# Patient Record
Sex: FEMALE | Race: WHITE
[De-identification: names, ages, dates, MRNs, and addresses within clinical notes are randomized per-mention and may not be internally consistent; named-entity substitution may affect disease eponyms.]

---

## 2022-10-13 ENCOUNTER — HOSPITAL ENCOUNTER (INPATIENT)
Dept: HOSPITAL 97 - ER | Age: 82
LOS: 1 days | Discharge: HOME | DRG: 313 | End: 2022-10-14
Attending: INTERNAL MEDICINE | Admitting: INTERNAL MEDICINE
Payer: COMMERCIAL

## 2022-10-13 VITALS — OXYGEN SATURATION: 98 %

## 2022-10-13 VITALS — BODY MASS INDEX: 31.6 KG/M2

## 2022-10-13 DIAGNOSIS — I35.1: ICD-10-CM

## 2022-10-13 DIAGNOSIS — E87.1: ICD-10-CM

## 2022-10-13 DIAGNOSIS — R07.89: Primary | ICD-10-CM

## 2022-10-13 DIAGNOSIS — Z20.822: ICD-10-CM

## 2022-10-13 DIAGNOSIS — I10: ICD-10-CM

## 2022-10-13 DIAGNOSIS — Z82.0: ICD-10-CM

## 2022-10-13 DIAGNOSIS — J30.9: ICD-10-CM

## 2022-10-13 DIAGNOSIS — Z91.14: ICD-10-CM

## 2022-10-13 DIAGNOSIS — R00.0: ICD-10-CM

## 2022-10-13 DIAGNOSIS — F32.A: ICD-10-CM

## 2022-10-13 DIAGNOSIS — Z79.899: ICD-10-CM

## 2022-10-13 DIAGNOSIS — F41.9: ICD-10-CM

## 2022-10-13 DIAGNOSIS — Z88.8: ICD-10-CM

## 2022-10-13 DIAGNOSIS — K21.9: ICD-10-CM

## 2022-10-13 DIAGNOSIS — J44.9: ICD-10-CM

## 2022-10-13 DIAGNOSIS — K57.90: ICD-10-CM

## 2022-10-13 DIAGNOSIS — Z88.3: ICD-10-CM

## 2022-10-13 DIAGNOSIS — E05.90: ICD-10-CM

## 2022-10-13 DIAGNOSIS — E78.5: ICD-10-CM

## 2022-10-13 DIAGNOSIS — M81.0: ICD-10-CM

## 2022-10-13 DIAGNOSIS — I24.8: ICD-10-CM

## 2022-10-13 LAB
ALBUMIN SERPL BCP-MCNC: 3.8 G/DL (ref 3.4–5)
ALP SERPL-CCNC: 91 U/L (ref 45–117)
ALT SERPL W P-5'-P-CCNC: 20 U/L (ref 12–78)
AST SERPL W P-5'-P-CCNC: 13 U/L (ref 15–37)
BUN BLD-MCNC: 20 MG/DL (ref 7–18)
GLUCOSE SERPLBLD-MCNC: 128 MG/DL (ref 74–106)
HCT VFR BLD CALC: 48 % (ref 36–45)
LYMPHOCYTES # SPEC AUTO: 1.4 K/UL (ref 0.7–4.9)
MAGNESIUM SERPL-MCNC: 2.3 MG/DL (ref 1.8–2.4)
MCV RBC: 92.9 FL (ref 80–100)
NT-PROBNP SERPL-MCNC: 497 PG/ML (ref ?–450)
PMV BLD: 7.7 FL (ref 7.6–11.3)
POTASSIUM SERPL-SCNC: 3.8 MMOL/L (ref 3.5–5.1)
RBC # BLD: 5.17 M/UL (ref 3.86–4.86)

## 2022-10-13 PROCEDURE — 83735 ASSAY OF MAGNESIUM: CPT

## 2022-10-13 PROCEDURE — 85025 COMPLETE CBC W/AUTO DIFF WBC: CPT

## 2022-10-13 PROCEDURE — 80053 COMPREHEN METABOLIC PANEL: CPT

## 2022-10-13 PROCEDURE — 83880 ASSAY OF NATRIURETIC PEPTIDE: CPT

## 2022-10-13 PROCEDURE — 84484 ASSAY OF TROPONIN QUANT: CPT

## 2022-10-13 PROCEDURE — 84443 ASSAY THYROID STIM HORMONE: CPT

## 2022-10-13 PROCEDURE — 36415 COLL VENOUS BLD VENIPUNCTURE: CPT

## 2022-10-13 PROCEDURE — 87811 SARS-COV-2 COVID19 W/OPTIC: CPT

## 2022-10-13 PROCEDURE — 71046 X-RAY EXAM CHEST 2 VIEWS: CPT

## 2022-10-13 PROCEDURE — 84439 ASSAY OF FREE THYROXINE: CPT

## 2022-10-13 PROCEDURE — 80061 LIPID PANEL: CPT

## 2022-10-13 PROCEDURE — 99281 EMR DPT VST MAYX REQ PHY/QHP: CPT

## 2022-10-13 PROCEDURE — 80048 BASIC METABOLIC PNL TOTAL CA: CPT

## 2022-10-13 PROCEDURE — 93005 ELECTROCARDIOGRAM TRACING: CPT

## 2022-10-13 PROCEDURE — 93306 TTE W/DOPPLER COMPLETE: CPT

## 2022-10-13 RX ADMIN — AMLODIPINE BESYLATE SCH MG: 2.5 TABLET ORAL at 21:59

## 2022-10-13 RX ADMIN — ENOXAPARIN SODIUM SCH: 80 INJECTION SUBCUTANEOUS at 20:41

## 2022-10-13 RX ADMIN — ALPRAZOLAM SCH MG: 0.25 TABLET ORAL at 21:59

## 2022-10-13 RX ADMIN — ENOXAPARIN SODIUM SCH MG: 80 INJECTION SUBCUTANEOUS at 21:00

## 2022-10-13 NOTE — HP
Date of Admission:  10/13/2022



Chief Complaint:  Chest pain.



History Of Present Illness:  This is an 81-year-old very pleasant female patient, who has significant
 anxiety and depression problem after loss of her  and her son.  Takes her medications regular
ly, came into office for her regular followup visit.  Upon evaluation, her vital signs revealed that 
her heart rate was faster than normal.  Her pulse rate was 150 per minute and it was regular except e
very now and then, some irregularities was noted on examination.  Stat EKG was done, which did not sh
ow any atrial fibrillation as I was concerned about, but it did report a sinus tachycardia and it did
 show some ST-T changes.  The patient reports having some chest pressure type of feeling and she is v
shant vague historian, says that she had this chest pressure type of feeling in the past, but she had s
ome chest pressure type of feeling today as well.  Denies any shortness of breath, nausea, vomiting. 
 Because of significant anxiety and depression, she was crying most of the time while she was at the 
office.  After I talked to her and talked to her caregiver per the patient's request, I did recommend
 her to be admitted to the hospital and she was agreeable and plan of treatment was also discussed wi
th her.  The patient requested me to call her son to discuss details with him and I did try to contac
t him this evening and he was not available, so I will try again to contact him tomorrow.



Allergies:  BUSPIRONE AND SHE DESCRIBES FEELING CRAZY AFTER SHE TOOK THAT MEDICINE AND ALLERGIC TO CI
PRO, WHICH CAUSED CHEST PAIN TYPE OF SIDE EFFECTS.



Medications:  She takes atorvastatin 20 mg daily, amlodipine 2.5 mg 2 times a day, propranolol extend
ed release 80 mg daily at bedtime, olmesartan 40 mg p.o. daily, bupropion 75 mg 2 times a day, Pristi
q 100 mg daily.



Review of Systems:

Cardiovascular:  As mentioned above. 

Psychiatry:  As mentioned above. 



All other systems reviewed and negative.



Past Medical History:  Significant for allergic rhinitis, hypertension, hyperlipidemia, diverticulosi
s, hyperthyroidism, anxiety, depression, osteoporosis, and hyponatremia.



Past Surgical History:  Significant for tonsillectomy, back surgery, knee surgery, removal of lipoma 
from back.



Family History:  Father  from aortic aneurysm.  Mother , had dementia.



Social History:  Negative for smoking and alcohol use.



Physical Examination:

Vital Signs:  At office today, pulse rate was 150, blood pressure was 121/80, respiratory rate 18, te
mperature 97.1, weight 152.6 kg, height 63 inches. 

General:  Awake, alert, oriented, not in distress. 

HEENT:  Head atraumatic, normocephalic.  Conjunctivae nonerythematous.  Sclerae white.  Mouth, no thr
ush or edema noted.  Ears/Nose, no mass, lesion, discharge noted. 

Neck:  Supple. No JVD, lymph nodes, bruit, thyromegaly noted. 

Lungs:  Bilateral good equal air entry. Clear to auscultation. No rhonchi.  No rales. 

Heart:  Normal heart sounds, no murmur or gallop. 

Abdomen:  Soft, bowel sounds normal. No guarding, rigidity, tenderness, mass, hepatosplenomegaly, dis
tention, or bruit noted. 

Extremities:  No leg edema.  No calf tenderness. 

Skin:  No rash, ulcer, cellulitis. 

Lymphatics:  No lymph node enlargement in neck, supraclavicular, infraclavicular region. 

Neuro:  No focal neurological deficit. 

Chest:  Unremarkable. 

External Genitalia:  Deferred. 

Rectal:  Deferred. 

Psychiatry:  Patient appears extremely anxious and frowned a lot due to her underlying depression.



Laboratory Data:  EKG shows nonspecific ST-T changes.  Heart rate 150 per minute and shows sinus tach
ycardia, still concerned about the underlying possibility of atrial fibrillation, but we did not see 
at present time.  Sodium 138, potassium 3.8, chloride 103, bicarb 29, BUN 20, creatinine 1.20, glucos
e 128.  Liver function tests unremarkable.  ProBNP 497.  Troponin 135.  COVID-19 test negative.  Whit
e count 8.3, hemoglobin 16.2, platelets 232.  Chest x-ray, no acute cardiopulmonary changes.



Impression:  

1.Non-ST elevation myocardial infarction.

2.Hypertension.

3.Hyperlipidemia.

4.Anxiety.

5.Depression.

6.Osteoporosis.

7.Hyperthyroidism.

8.Allergic rhinitis.

9.Diverticulosis.



Plan:  We will go ahead and admit the patient to hospital for further evaluation and management of th
is problem.  The patient is appropriate for inpatient and is expected to spend 2 midnights in [facility].  We will admit her to telemetry.  Home medications will be continued, which will be amlodipine for
 hypertension.  Instead of propranolol, I will start her on metoprolol 25 mg 2 times a day.  First do
se was given earlier today and we will give second dose tonight.  We will start her on aspirin therap
y, increase dose of cholesterol medication, which is atorvastatin to 40 mg daily at bedtime.  We will
 continue bupropion per order for her anxiety and depression problem.  The patient is having signific
ant anxiety problem and we will go ahead and start her on alprazolam 0.25 mg 2 times a day starting t
onight.  Her anxiety is unfortunately going to be lot worse while she is in the hospital because of t
he stressful situation.  Initially, she was not even willing to go to the hospital, but after I talke
d to her, she was agreeable to do so.  We will consult Cardiology Service.  I will go ahead and get a
n echo with Doppler tomorrow.  I did contact Dr. Fischer and discussed all the details with him and h
e will evaluate her tomorrow morning.  We will monitor on telemetry for any cardiac arrhythmia.  Love
nox 1 mg/kg subcutaneous injection every 12 hours will be started with the first dose tonight.  I did
 try to contact the patient's son and he was not available, we will try to contact him again tomorrow
.





GABY/OSKAR

DD:  10/13/2022 20:15:41Voice ID:  [id number]

## 2022-10-13 NOTE — ER
Nurse's Notes                                                                                     

 [facility]                                                                 

Name: Nellie Malcolm                                                                                

Age: 81 yrs                                                                                       

Sex: Female                                                                                       

: 1940                                                                                   

MRN: [id number]                                                                                   

Arrival Date: 10/13/2022                                                                          

Time: 14:42                                                                                       

Account#: [id number]                                                                            

Bed Direct Admit                                                                                  

Private MD: Rubén Archuleta                                                                          

Diagnosis: Chest pain, unspecified                                                                

                                                                                                  

Presentation:                                                                                     

10/13                                                                                             

14:45 Chief complaint: Patient states: direct admit by . Coronavirus screen: Vaccine   [facility] 

      status: Patient reports receiving the 2nd dose of the covid vaccine. Client denies          

      travel out of the [place] in the last 14 days. Ebola Screen: Patient negative for fever        

      greater than or equal to 101.5 degrees Fahrenheit, and additional compatible Ebola          

      Virus Disease symptoms Patient denies exposure to infectious person. Patient denies         

      travel to an Ebola-affected area in the 21 days before illness onset. Initial Sepsis        

      Screen: Does the patient meet any 2 criteria? No. Patient's initial sepsis screen is        

      negative. Does the patient have a suspected source of infection? No. Patient's initial      

      sepsis screen is negative. Risk Assessment: Do you want to hurt yourself or someone         

      else? Patient reports no desire to harm self or others.                                     

14:45 Method Of Arrival: Ambulatory                                                           [facility] 

15:00 Acuity: STEVIE 3                                                                           em6 

15:00 Onset of symptoms was 2022.                                                 6 

                                                                                                  

Triage Assessment:                                                                                

14:50 General: Appears in no apparent distress. slender, well groomed, well developed,        [facility] 

      Behavior is calm, cooperative, appropriate for age. Pain: Denies pain. Cardiovascular:      

      No deficits noted.                                                                          

                                                                                                  

Historical:                                                                                       

- Allergies:                                                                                      

14:54 No Known Allergies;                                                                     [facility] 

- Home Meds:                                                                                      

18:17 sodium chloride 1 gram oral tab daily [Active]; bupropion HCl 75 mg Oral tab twice a    iw  

      day [Active]; desvenlafaxine 50 mg oral tr24 1 tab once daily [Active];                     

- PMHx:                                                                                           

14:54 Dementia;                                                                               [facility] 

                                                                                                  

- Immunization history:: Adult Immunizations up to date.                                          

- Social history:: Smoking status: Patient denies any tobacco usage or history of.                

                                                                                                  

                                                                                                  

Screening:                                                                                        

15:00 Abuse screen: Denies threats or abuse. Nutritional screening: No deficits noted.        em6 

      Tuberculosis screening: No symptoms or risk factors identified. Fall Risk Total Phillips       

      Fall Scale indicates No Risk (0-24 pts).                                                    

                                                                                                  

Assessment:                                                                                       

15:00 General: Appears uncomfortable, Behavior is anxious. Pain: Denies pain. Neuro: Level of em6 

      Consciousness is awake, alert, obeys commands, Oriented to person, place, time,             

      situation. Cardiovascular: Heart tones present Patient's skin is warm and dry.              

      Respiratory: Airway is patent Respiratory effort is even, unlabored, Respiratory            

      pattern is regular, symmetrical. GI: No signs and/or symptoms were reported involving       

      the gastrointestinal system. : No signs and/or symptoms were reported regarding the       

      genitourinary system. EENT: No signs and/or symptoms were reported regarding the EENT       

      system. Derm: No signs and/or symptoms reported regarding the dermatologic system.          

      Musculoskeletal: Circulation, motion, and sensation intact. Range of motion: intact in      

      all extremities.                                                                            

16:00 Reassessment: No changes from previously documented assessment. Patient and/or family   em6 

      updated on plan of care and expected duration. Pain level reassessed. Patient is alert,     

      oriented x 3, equal unlabored respirations, skin warm/dry/pink.                             

17:00 Reassessment: No changes from previously documented assessment. Patient and/or family   em6 

      updated on plan of care and expected duration. Pain level reassessed. Patient is alert,     

      oriented x 3, equal unlabored respirations, skin warm/dry/pink.                             

18:00 Reassessment: No changes from previously documented assessment. Patient and/or family   em6 

      updated on plan of care and expected duration. Pain level reassessed. Patient is alert,     

      oriented x 3, equal unlabored respirations, skin warm/dry/pink.                             

19:00 Reassessment: No changes from previously documented assessment. Patient and/or family   em6 

      updated on plan of care and expected duration. Pain level reassessed. Patient is alert,     

      oriented x 3, equal unlabored respirations, skin warm/dry/pink.                             

                                                                                                  

Vital Signs:                                                                                      

14:45  / 74; Pulse 73; Resp 16; Temp 98.8; Pulse Ox 98% ; Weight 70.31 kg; Height 5 ft. [facility] 

      4 in. (162.56 cm);                                                                          

14:45 Body Mass Index 26.61 (70.31 kg, 162.56 cm)                                             [facility] 

                                                                                                  

ED Course:                                                                                        

14:42 Patient arrived in ED.                                                                  mr  

14:43 Rubén Archuleta MD is Private Physician.                                                  mr  

14:50 Arm band placed on right wrist.                                                         jh5 

15:00 Placed in gown. Bed in low position. Call light in reach. Side rails up X2. Cardiac     em6 

      monitor on. Pulse ox on. NIBP on. Warm blanket given.                                       

15:00 Patient maintains SpO2 saturation greater than 95% on room air.                         em6 

16:07 Sarah Joseph, RN is Primary Nurse.                                                   em6 

20:00 No provider procedures requiring assistance completed. Patient admitted, IV remains in  em6 

      place.                                                                                      

20:01 Rubén Archuleta MD is Hospitalizing Provider.                                             iw  

23:16 Triage completed.                                                                       em6 

                                                                                                  

Administered Medications:                                                                         

No medications were administered                                                                  

                                                                                                  

                                                                                                  

Medication:                                                                                       

20:00 VIS not applicable for this client.                                                     em6 

                                                                                                  

Outcome:                                                                                          

20:00 Admitted to Med/surg accompanied by tech, via stretcher.                                em6 

20:00 Condition: stable                                                                           

20:00 Instructed on the need for admit, Demonstrated understanding of instructions.               

20:02 Decision to Hospitalize by Provider.                                                    iw  

20:02 Patient left the ED.                                                                    iw  

                                                                                                  

Signatures:                                                                                       

Maisha Delaney Irene, RN                     JUNIOR                                                      

Madeleine Malave RN                       RN   [facility]                                                  

Sarah Joseph, RN                     RN   em6                                                  

                                                                                                  

Corrections: (The following items were deleted from the chart)                                    

23:15 23:15 Patient maintains SpO2 saturation greater than 95% on room air. em6               em6 

23:15 23:15 No provider procedures requiring assistance completed. em6                        em6 

23:15 23:15 Patient admitted, IV remains in place. em6                                        em6 

                                                                                                  

**************************************************************************************************

## 2022-10-13 NOTE — RAD REPORT
EXAM DESCRIPTION:  RAD - Chest Pa And Lat (2 Views) - 10/13/2022 6:41 pm

 

CLINICAL HISTORY:  chest pain

Chest pain.

 

COMPARISON:  CHEST PA AND LAT 2 VIEW dated 12/21/2014

 

TECHNIQUE:  PA and lateral views of the chest were obtained.

 

FINDINGS:  The lungs are hyperexpanded compatible with COPD. The heart is upper limit of normal in si
ze. No fracture or aggressive bony process.

 

IMPRESSION:  COPD without acute process identified.

 

The USPSTF recommends annual screening for lung cancer with low-dose CT (LDCT) in adults aged 50 to 8
5 years who have a 20 pack-year smoking history and currently smoke or have quit within the past 15 y
ears.

## 2022-10-14 VITALS — SYSTOLIC BLOOD PRESSURE: 147 MMHG | DIASTOLIC BLOOD PRESSURE: 94 MMHG

## 2022-10-14 VITALS — TEMPERATURE: 97.7 F

## 2022-10-14 LAB
BUN BLD-MCNC: 22 MG/DL (ref 7–18)
GLUCOSE SERPLBLD-MCNC: 113 MG/DL (ref 74–106)
HDLC SERPL-MCNC: 48 MG/DL (ref 40–60)
LDLC SERPL CALC-MCNC: 80 MG/DL (ref ?–130)
MAGNESIUM SERPL-MCNC: 2.3 MG/DL (ref 1.8–2.4)
POTASSIUM SERPL-SCNC: 4 MMOL/L (ref 3.5–5.1)
TROPONIN I SERPL HS-MCNC: 99.3 PG/ML (ref ?–58.9)
TSH SERPL DL<=0.05 MIU/L-ACNC: 0.26 UIU/ML (ref 0.36–3.74)

## 2022-10-14 RX ADMIN — ENOXAPARIN SODIUM SCH MG: 80 INJECTION SUBCUTANEOUS at 08:19

## 2022-10-14 RX ADMIN — AMLODIPINE BESYLATE SCH MG: 2.5 TABLET ORAL at 08:20

## 2022-10-14 RX ADMIN — ALPRAZOLAM SCH: 0.25 TABLET ORAL at 08:06

## 2022-10-14 RX ADMIN — ALPRAZOLAM SCH MG: 0.25 TABLET ORAL at 12:00

## 2022-10-14 NOTE — ECHO
HEIGHT: 5 ft 2 in   WEIGHT: 173 lb 3.2 oz   DATE OF STUDY: 10/14/2022   REFER DR: Rubén Archuleta MD

2-DIMENSIONAL: YES

     M.MODE: YES

 DOPPLER: YES

COLOR FLOW: YES



                    TDS:  NO

PORTABLE: YES

 DEFINITY:  NO

BUBBLE STUDY: NO





DIAGNOSIS:  CHEST PAIN



CARDIAC HISTORY:  

CATHERIZATION: 

SURGERY: 

PROSTHETIC VALVE: 

PACEMAKER: 





MEASUREMENTS (cm)

    DIASTOLIC (NORMALS)                 SYSTOLIC (NORMALS)

IVSd                 1.1 (0.6-1.2)                    LA Diam 3.8 (1.9-4.0)     LVEF       
  55%  

LVIDd               3.9 (3.5-5.7)                        LVIDs      2.8 (2.0-3.5)     %FS  
        28%

LVPWd             1.1 (0.6-1.2)

Ao Diam           3.0 (2.0-3.7)



2 DIMENSIONAL ASSESSMENT:

RIGHT ATRIUM:                   NORMAL

LEFT ATRIUM:       NORMAL



RIGHT VENTRICLE:            NORMAL

LEFT VENTRICLE: NORMAL



TRICUSPID VALVE:             NORMAL

MITRAL VALVE:  MITRAL ANNULAR CALCIFICATION



PULMONIC VALVE:             NORMAL

AORTIC VALVE:     SCLEROSIS



PERICARDIAL EFFUSION: NONE

AORTIC ROOT:      NORMAL





LEFT VENTRICULAR WALL MOTION:     NORMAL



DOPPLER/COLOR FLOW:     MILD TRICUSPID REGURGITATION.



COMMENTS:      MILD TRICUSPID REGURGITATION. NORMAL RIGHT VENTRICULAR SYSTOLIC PRESSURE. 
NORMAL LEFT VENTRICULAR SIZE AND FUNCTION. MITRAL ANNULAR CALCIFICATION. AORTIC SCLEROSIS 
WITH NO STENOSIS.



TECHNOLOGIST:   ANNA GOODE

## 2022-10-14 NOTE — DS
Date of Discharge:  10/14/2022



Disposition:  Discharged to go home.



Physical Examination:

HEENT:  Unremarkable. 

Lungs:  Clear to auscultation. 

Heart:  Sounds normal. 

Abdomen:  Soft.  Bowel sounds normal.  No guarding, rigidity, tenderness, or distention. 

Extremities:  No leg edema.



Discharge Medications/instructions:  

1.Continue all prior home medications except stop propranolol.

2.Metoprolol 50 mg 2 times a day.

3.Start aspirin 81 mg daily, take it with food. 

4.Increase dose of atorvastatin 20 mg, take 2 tablets by mouth daily.

5.Follow up at my office and Dr. Fischer next week.



Hospital Course:  This is an 81-year-old very pleasant female patient, who came into office yesterday
 and was admitted to the hospital.  Please see dictated H and P for more information.  After patient 
was evaluated at the office, she was noted to have rapid heart rate of around 150 per minute.  The ou
tpatient EKG had shown sinus tachycardia, but no evidence of atrial fibrillation.  The patient was ad
mitted to the hospital and her initial blood work was unremarkable.  Electrolytes were normal.  Her i
nitial troponin was 135 this morning.  Repeat troponin was 95.  After she came into the hospital, she
 was started on metoprolol, Lovenox, aspirin, and high dose statin therapy.  Dr. Fischer from Cardiol
ogy Service was consulted and he did see patient this morning.  Details were discussed with him yeste
rday and this morning.  After Dr. Fischer evaluated her, he informed me that from his point of view, 
the patient can go home, and he will arrange for outpatient stress test to be done at his office and 
at this point, he does not believe that the patient had any myocardial infarction and I agree on basi
s of her second troponin level that probably what she had was demand ischemia and not necessarily any
 evidence of myocardial infarction.  Echocardiogram was done today per order and Dr. Fischer will mihai
luate that and informed me of the results.  Extremely agitated, upset, and will not allow nursing sta
ff to do anything for her, and wanted to talk to me, so when I talked to her, she was very upset at m
e and angry and informed me that she had not seen me and I have not done anything for her.  She even 
told me that she is going to fire me and she will continue to use Dr. Fischer on an outpatient basis 
and told me that she should not have come to the hospital as she just does not feel comfortable being
 in this hospital at all and does not trust anybody.  I did call the patient's son and communicated a
ll the details with him and he informed me that the patient has had multiple hospitalizations in [place] and during some of the hospitalization, she actually was very upset, anxious, agitated, wanted to 
leave hospital, and at some point, son had to talk to even hospital psychiatrist and during one of th
e hospital admissions, her sodium level was very low.  During our hospitalization here today, her sod
ium level is normal.  The patient has significant anxiety and depression problem.  Last night, she wa
s given 1 dose of alprazolam and around lunch time today when I ordered second dose of alprazolam, sh
amber refused to take any.  I did inform the patient's son that medically she is stable for discharge and
 she then informed me that, that is good enough for him to go ahead and make arrangements for her to 
go home and he will communicate with her and take care of her needs.  If the patient decides to ivana garland to see me, which I hope so, she will come see me next week as suggested.  I will not change any o
f her medication for anxiety or depression and I have suggested patient's son that the patient should
 start seeing psychiatrist on a regular basis for management of her anxiety and depression problem.



Final Diagnoses:  

1.Chest pain.

2.Sinus tachycardia.

3.Anxiety.

4.Depression.

5.Hypertension.

6.Hyperlipidemia.

7.Aortic valve regurgitation.

8.Diverticulosis.





GABY/MODL

DD:  10/14/2022 16:02:23Voice ID:  [id number]

DT:  10/14/2022 19:48:23Report ID:  [id number]

## 2022-10-16 NOTE — CON
Date of Consultation:  10/14/2022



Reason For Consultation:  Chest pain.



History Of Present Illness:  Ms. Malcolm is 81.  Has a history of COPD, anxiety, hypertension.  Came i
n with what looks more like a COPD exacerbation, atypical chest pain, pressure-like, nonradiating, no
nexertional, some diaphoresis, but no nausea or vomiting.  Denied PND, orthopnea, pedal edema, palpit
ations, or syncope.  Troponin was 135.  Glucose was 138.  Ms. Malcolm has been under significant amoun
t of stress.  She has lost her , has lost her son recently.  Again, as stated earlier chest pr
essure was rather continuous without other cardiac symptoms.  She is feeling pretty much back to norm
al today.  Had received some Xanax overnight and that really helped her out.



Past Medical History:  Includes hypertension, dyslipidemia.



Review of Systems:

Negative.



Social History:  Negative.



Family History:  Noncontributory.



Medications:  Present medications include Xanax, metoprolol, Norvasc, aspirin, statin, and Lovenox.



Physical Examination:

Vital Signs:  Stable, afebrile. 

HEENT:  Negative. 

Neck:  Supple with no bruit. 

Chest:  Clear. 

Cardiac:  Revealed a regular rhythm and rate.  No murmurs, gallops, or rubs. 

Abdomen:  Benign. 

Extremities:  Revealed no clubbing, cyanosis, or edema.



Diagnostic Data:  As stated earlier.  EKG is unremarkable.



Impression And Plan:  Atypical chest pain, possibly anxiety related or gastroesophageal reflux diseas
e related.  She does have high blood pressure and cholesterol and is 81 and certainly at risk of havi
ng coronary artery disease.  I am comfortable with her going home whenever it is okay with Dr. Archuleta. 
 I will make an arrangement for her to have an echocardiogram and a Lexiscan in the near future and I
 will see her in the office.  Meanwhile, continue her present regimen as far as her anxiety, high blo
od pressure dyslipidemia.  Her chest x-ray showed some mild chronic obstructive pulmonary disease, wh
ich we will just observe for now.  Case was discussed with Dr. Archuleta.





NB/OSKAR

DD:  10/16/2022 11:53:38Voice ID:  [id number]

DT:  10/16/2022 15:41:40Report ID:  [id number]

## 2022-10-19 ENCOUNTER — HOSPITAL ENCOUNTER (INPATIENT)
Dept: HOSPITAL 97 - ER | Age: 82
LOS: 12 days | Discharge: HOME HEALTH | DRG: 536 | End: 2022-10-31
Attending: INTERNAL MEDICINE | Admitting: INTERNAL MEDICINE
Payer: COMMERCIAL

## 2022-10-19 VITALS — BODY MASS INDEX: 30.1 KG/M2

## 2022-10-19 DIAGNOSIS — S32.10XA: ICD-10-CM

## 2022-10-19 DIAGNOSIS — K57.90: ICD-10-CM

## 2022-10-19 DIAGNOSIS — S30.0XXA: ICD-10-CM

## 2022-10-19 DIAGNOSIS — E78.5: ICD-10-CM

## 2022-10-19 DIAGNOSIS — S32.591A: Primary | ICD-10-CM

## 2022-10-19 DIAGNOSIS — Z88.1: ICD-10-CM

## 2022-10-19 DIAGNOSIS — Z79.82: ICD-10-CM

## 2022-10-19 DIAGNOSIS — D64.9: ICD-10-CM

## 2022-10-19 DIAGNOSIS — Z88.8: ICD-10-CM

## 2022-10-19 DIAGNOSIS — F32.9: ICD-10-CM

## 2022-10-19 DIAGNOSIS — Z20.822: ICD-10-CM

## 2022-10-19 DIAGNOSIS — F03.90: ICD-10-CM

## 2022-10-19 DIAGNOSIS — I95.9: ICD-10-CM

## 2022-10-19 DIAGNOSIS — Z79.899: ICD-10-CM

## 2022-10-19 DIAGNOSIS — Z63.4: ICD-10-CM

## 2022-10-19 DIAGNOSIS — E03.9: ICD-10-CM

## 2022-10-19 DIAGNOSIS — E87.1: ICD-10-CM

## 2022-10-19 DIAGNOSIS — F41.9: ICD-10-CM
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
## 2022-10-19 DIAGNOSIS — I25.10: ICD-10-CM

## 2022-10-19 DIAGNOSIS — I35.1: ICD-10-CM
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
## 2022-10-19 DIAGNOSIS — I10: ICD-10-CM
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
## 2022-10-19 DIAGNOSIS — Z78.1: ICD-10-CM

## 2022-10-19 DIAGNOSIS — K59.00: ICD-10-CM

## 2022-10-19 DIAGNOSIS — I48.0: ICD-10-CM

## 2022-10-19 DIAGNOSIS — R77.8: ICD-10-CM

## 2022-10-19 DIAGNOSIS — R11.2: ICD-10-CM

## 2022-10-19 DIAGNOSIS — E86.9: ICD-10-CM

## 2022-10-19 LAB
BUN BLD-MCNC: 18 MG/DL (ref 7–18)
GLUCOSE SERPLBLD-MCNC: 106 MG/DL (ref 74–106)
HCT VFR BLD CALC: 46.1 % (ref 36–45)
INR BLD: 0.97
LYMPHOCYTES # SPEC AUTO: 1.5 K/UL (ref 0.7–4.9)
MCV RBC: 92.4 FL (ref 80–100)
PMV BLD: 8.1 FL (ref 7.6–11.3)
POTASSIUM SERPL-SCNC: 4.2 MMOL/L (ref 3.5–5.1)
RBC # BLD: 4.99 M/UL (ref 3.86–4.86)

## 2022-10-19 PROCEDURE — 97161 PT EVAL LOW COMPLEX 20 MIN: CPT

## 2022-10-19 PROCEDURE — 97164 PT RE-EVAL EST PLAN CARE: CPT

## 2022-10-19 PROCEDURE — 76705 ECHO EXAM OF ABDOMEN: CPT

## 2022-10-19 PROCEDURE — 97530 THERAPEUTIC ACTIVITIES: CPT

## 2022-10-19 PROCEDURE — 85014 HEMATOCRIT: CPT

## 2022-10-19 PROCEDURE — 74176 CT ABD & PELVIS W/O CONTRAST: CPT

## 2022-10-19 PROCEDURE — 80053 COMPREHEN METABOLIC PANEL: CPT

## 2022-10-19 PROCEDURE — 96374 THER/PROPH/DIAG INJ IV PUSH: CPT

## 2022-10-19 PROCEDURE — 87811 SARS-COV-2 COVID19 W/OPTIC: CPT

## 2022-10-19 PROCEDURE — 99285 EMERGENCY DEPT VISIT HI MDM: CPT

## 2022-10-19 PROCEDURE — 84484 ASSAY OF TROPONIN QUANT: CPT

## 2022-10-19 PROCEDURE — 83735 ASSAY OF MAGNESIUM: CPT

## 2022-10-19 PROCEDURE — 85018 HEMOGLOBIN: CPT

## 2022-10-19 PROCEDURE — 36415 COLL VENOUS BLD VENIPUNCTURE: CPT

## 2022-10-19 PROCEDURE — 85730 THROMBOPLASTIN TIME PARTIAL: CPT

## 2022-10-19 PROCEDURE — 85025 COMPLETE CBC W/AUTO DIFF WBC: CPT

## 2022-10-19 PROCEDURE — 93005 ELECTROCARDIOGRAM TRACING: CPT

## 2022-10-19 PROCEDURE — 96375 TX/PRO/DX INJ NEW DRUG ADDON: CPT

## 2022-10-19 PROCEDURE — 97110 THERAPEUTIC EXERCISES: CPT

## 2022-10-19 PROCEDURE — 70450 CT HEAD/BRAIN W/O DYE: CPT

## 2022-10-19 PROCEDURE — 85610 PROTHROMBIN TIME: CPT

## 2022-10-19 PROCEDURE — 97116 GAIT TRAINING THERAPY: CPT

## 2022-10-19 PROCEDURE — 80048 BASIC METABOLIC PNL TOTAL CA: CPT

## 2022-10-19 PROCEDURE — 72125 CT NECK SPINE W/O DYE: CPT

## 2022-10-19 PROCEDURE — 72192 CT PELVIS W/O DYE: CPT

## 2022-10-19 SDOH — SOCIAL STABILITY - SOCIAL INSECURITY: DISSAPEARANCE AND DEATH OF FAMILY MEMBER: Z63.4

## 2022-10-19 NOTE — RAD REPORT
EXAM DESCRIPTION:  CT - CTHCSPWOC - 10/19/2022 7:45 pm

 

CLINICAL HISTORY:  Trauma, head and neck injury.

fall, head injury

 

COMPARISON:  No comparisons

 

TECHNIQUE:  Axial 5 mm thick images of the head were obtained.

 

Axial 2 mm thick images of the cervical spine were obtained with sagittal and coronal reconstruction 
images generated and reviewed.

 

All CT scans are performed using dose optimization technique as appropriate and may include automated
 exposure control or mA/KV adjustment according to patient size.

 

FINDINGS:  CT HEAD WITHOUT CONTRAST:

 

No acute hemorrhage, hydrocephalus or extra-axial collection is identified.Moderate generalized brain
 atrophy is present with moderate periventricular and deep white matter chronic microvascular ischemi
c changes.No areas of brain edema or midline shift.

 

The paranasal sinuses and mastoids are clear.The calvarium is intact. Prominent vertebrobasilar ather
osclerosis.

 

CT CERVICAL SPINE WITHOUT CONTRAST:

 

No fracture or subluxation.Mild lower cervical degenerative spondylosis.No prevertebral soft tissues 
swelling is identified. Significant bilateral carotid atherosclerosis.

 

IMPRESSION:  No acute intracranial or cervical spine findings.

## 2022-10-19 NOTE — RAD REPORT
EXAM DESCRIPTION:  RAD - Ankle Right 3 View - 10/19/2022 7:46 pm

 

CLINICAL HISTORY:  PAIN

 

COMPARISON:  No comparisons

 

FINDINGS:  Diffuse osteopenia. Moderate posterior calcaneal spur. Lucency is noted at the base of the
 fifth metatarsal, likely a fracture.

## 2022-10-19 NOTE — ER
Nurse's Notes                                                                                     

 [facility]                                                                 

Name: Nellie Malcolm                                                                                

Age: 81 yrs                                                                                       

Sex: Female                                                                                       

: 1940                                                                                   

MRN: [id number]                                                                                   

Arrival Date: 10/19/2022                                                                          

Time: 18:49                                                                                       

Account#: [id number]                                                                            

Bed 23                                                                                            

Private MD:                                                                                       

Diagnosis: Mechanical Fall;Closed left superior and inferior pubic rami fractures;Closed Fifth    

  metatarsal fracture, right                                                                      

                                                                                                  

Presentation:                                                                                     

10/19                                                                                             

18:59 Chief complaint: EMS states: toned out for fall from standing, R hip, leg, and arm are  eh3 

      hurting. Coronavirus screen: Vaccine status: Patient reports receiving the 2nd dose of      

      the covid vaccine. Ebola Screen: No symptoms or risks identified at this time. Initial      

      Sepsis Screen: Does the patient meet any 2 criteria? No. Patient's initial sepsis           

      screen is negative. Does the patient have a suspected source of infection? No.              

      Patient's initial sepsis screen is negative. Risk Assessment: Do you want to hurt           

      yourself or someone else? Patient reports no desire to harm self or others. Onset of        

      symptoms was 2022.                                                              

18:59 Method Of Arrival: EMS: [facility] 

18:59 Acuity: STEVIE 3                                                                           eh3 

21:46 Note pt refusing all interventions sons updated on pt condition son MPOA requests all   kl  

      pain medications and radiographic studies be done pt updated medcations given per MD        

      order.                                                                                      

                                                                                                  

Triage Assessment:                                                                                

19:01 General: Appears distressed, uncomfortable, Behavior is cooperative, appropriate for    eh3 

      age, crying. Pain: Complains of pain in right hip Pain radiates to right low back,          

      right foot and right leg Pain currently is 10 out of 10 on a pain scale. Neuro: Level       

      of Consciousness is awake, alert, obeys commands, Oriented to person, place, time,          

      situation. Cardiovascular: Capillary refill < 3 seconds Patient's skin is warm and dry.     

      Respiratory: Airway is patent Respiratory effort is even, unlabored. Musculoskeletal:       

      Circulation, motion, and sensation intact.                                                  

                                                                                                  

Historical:                                                                                       

- Home Meds:                                                                                      

19:01 bupropion HCl 75 mg Oral tab twice a day [Active]; desvenlafaxine 50 mg Oral tr24 1 tab eh3 

      once daily [Active]; sodium chloride 1 gram Oral tab daily [Active];                        

- PMHx:                                                                                           

19: Dementia;                                                                               eh3 

                                                                                                  

- Immunization history:: Adult Immunizations up to date.                                          

- Social history:: Smoking status: unknown.                                                       

                                                                                                  

                                                                                                  

Screenin:05 Abuse screen: Denies threats or abuse. Denies injuries from another. Nutritional        eh3 

      screening: No deficits noted. Tuberculosis screening: No symptoms or risk factors           

      identified. Fall Risk Fall in past 12 months (25 points). Secondary diagnosis (15           

      points) dementia, IV access (20 points). Ambulatory Aid- None/Bed Rest/Nurse Assist (0      

      pts). Gait- Impaired (20 pts.). Mental Status- Overestimates/Forgets Limitations (15        

      pts.). Total Phillips Fall Scale indicates High Risk Score (45 or more points). Fall           

      prevention measures have been instituted. Side Rails Up X 2 Placed Close to Nursing         

      Station Frequent Obs/Assessments Occuring Family Present and informed to notify staff       

      if the need to leave the bedside As available patient and family educated on Fall           

      Prevention Program and Strategies.                                                          

                                                                                                  

Assessment:                                                                                       

19:05 Reassessment: No changes from previously documented assessment. See triage assessment.  eh3 

20:00 Reassessment: Patient and/or family updated on plan of care and expected duration. Pain eh3 

      level reassessed. Patient is alert, oriented x 3, equal unlabored respirations, skin        

      warm/dry/pink. Pt states no pain relief from Fentanyl given at 1922; refusing morphine      

      for pain. Also refusing CT scan. Son Kylah Malcolm is present; states his brother Nathen Malcolm has medical power of . I witnessed the phone call between Dr. Martin and     

      DHARMESHA Nathen Gold; Nathen Malcolm requests that his mother be given medical treatment for        

      her fall including pain medication and CT scan..                                            

21:35 Reassessment: Patient and/or family updated on plan of care and expected duration. Pain eh3 

      level reassessed. Patient is alert, oriented x 3, equal unlabored respirations, skin        

      warm/dry/pink. Pt now agrees to receive medications. I witnessed charge nurse Anastasiia           

      discuss the need for pain medicine and CT scan with pt.                                     

22:36 Reassessment: Patient and/or family updated on plan of care and expected duration. Pain eh3 

      level reassessed. Patient is alert, oriented x 3, equal unlabored respirations, skin        

      warm/dry/pink.                                                                              

23:30 Reassessment: Patient and/or family updated on plan of care and expected duration. Pain eh3 

      level reassessed. Patient is alert, oriented x 3, equal unlabored respirations, skin        

      warm/dry/pink.                                                                              

                                                                                                  

Vital Signs:                                                                                      

18:55  / 84; Pulse 60; Resp 20; Temp 97.8(O); Pulse Ox 97% on R/A; Pain 8/10;           kj1 

19:05  / 83; Pulse 59; Resp 20; Pulse Ox 97% on R/A; Weight 77.11 kg; Height 5 ft. 3    eh3 

      in. (160.02 cm);                                                                            

20:00  / 86; Pulse 62; Resp 18; Pulse Ox 97% on R/A;                                    eh3 

21:00  / 58; Pulse 96; Resp 18; Pulse Ox 95% on R/A;                                    eh3 

22:00  / 75; Pulse 95; Resp 18; Pulse Ox 95% on R/A;                                    eh3 

23:00  / 74; Pulse 71; Resp 18; Pulse Ox 96% on R/A;                                    eh3 

19:05 Body Mass Index 30.11 (77.11 kg, 160.02 cm)                                             3 

                                                                                                  

ED Course:                                                                                        

18:49 Patient arrived in ED.                                                                  mw2 

18:58 Sara Hawkins, RN is Primary Nurse.                                                        eh3 

19:01 Triage completed.                                                                       eh3 

19:05 Porsche Martin MD is Attending Physician.                                           sd2 

19:05 Patient has correct armband on for positive identification. Bed in low position. Call   3 

      light in reach. Side rails up X2. Adult w/ patient. Client placed on continuous cardiac     

      and pulse oximetry monitoring. NIBP monitoring applied. Noise minimized. Lights dimmed.     

      Warm blanket given.                                                                         

19:46 CT Head C Spine In Process Unspecified.                                                 EDMS

19:48 XRAY Ankle RIGHT 3 view In Process Unspecified.                                         EDMS

19:48 XRAY Hip RIGHT 2 view In Process Unspecified.                                           EDMS

19:48 XRAY Elbow RIGHT 3 view In Process Unspecified.                                         EDMS

20:00 Patient notified of wait time.                                                          eh3 

21:50 Pelvis Wo Cont In Process Unspecified.                                                  EDMS

22:19 MACKENZIE Archuleta MD is Hospitalizing Provider.                                                  sd2 

22:29 SARS RAPID Sent.                                                                        eh3 

23:57 No provider procedures requiring assistance completed. Patient admitted, IV remains in  eh3 

      place.                                                                                      

                                                                                                  

Administered Medications:                                                                         

19:22 Drug: fentaNYL (PF) 50 mcg Route: IVP; Site: right antecubital;                         3 

20:42 Follow up: Response: Pain is unchanged, physician notified                              eh3 

21:27 Drug: morphine 4 mg Route: IVP; Infused Over: 4 mins; Site: right antecubital;            

22:19 Follow up: Response: No adverse reaction                                                eh3 

22:20 Follow up: Response: Pain is unchanged, physician notified                              3 

21:27 Drug: Zofran (Ondansetron) 4 mg Route: IVP; Site: right antecubital;                      

22:20 Follow up: Response: No adverse reaction                                                3 

                                                                                                  

                                                                                                  

Medication:                                                                                       

23:57 VIS not applicable for this client.                                                     eh3 

                                                                                                  

Outcome:                                                                                          

22:21 Decision to Hospitalize by Provider.                                                    sd2 

23:56 Admitted to Med/surg via stretcher, room 208, Report called to  JUNIOR Beasley              [facility] 

23:56 Condition: stable                                                                           

23:56 Instructed on the need for admit.                                                           

10/20                                                                                             

00:13 Patient left the ED.                                                                    3 

                                                                                                  

Signatures:                                                                                       

Dispatcher [employer]                           EDMS                                                 

Aspen Jackson RN RN kl [place], [facility]                            2                                                  

Margarita Woodard                              kj1                                                  

Sara Hawkins RN RN   [facility]                                                  

Porsche Martin MD MD   sd2                                                  

                                                                                                  

Corrections: (The following items were deleted from the chart)                                    

10/19                                                                                             

20:44 19:00 Reassessment: No changes from previously documented assessment. See triage        eh3 

      assessment 3                                                                              

21:35 20:00 Reassessment: Patient and/or family updated on plan of care and expected          eh3 

      duration. Pain level reassessed. Patient is alert, oriented x 3, equal unlabored            

      respirations, skin warm/dry/pink. Pt states no pain relief from Fentanyl given at __;       

      refusing morphine for pain. Also refusing CT scan. Son Kylah Malcolm is present; states       

      his brother Nathen Malcolm has medical power of . I witnessed the phone call           

      between Dr. Martin and POA Nathen Maryner; Nathen Malcolm requests that his mother be given       

      medical treatment for her fall including pain medication and CT scan.. eh3                  

                                                                                                  

**************************************************************************************************

## 2022-10-19 NOTE — EDPHYS
Physician Documentation                                                                           

 [facility]                                                                 

Name: Nellie Malcolm                                                                                

Age: 81 yrs                                                                                       

Sex: Female                                                                                       

: 1940                                                                                   

MRN: [id number]                                                                                   

Arrival Date: 10/19/2022                                                                          

Time: 18:49                                                                                       

Account#: [id number]                                                                            

Bed 23                                                                                            

Private MD:                                                                                       

ED Physician Porsche Martin                                                                    

HPI:                                                                                              

10/19                                                                                             

19:25 This 81 yrs old Female presents to ER via EMS with complaints of Fall Injury.           sd2 

19:25 81-year-old female with a history of dementia presents via EMS with chief complaint of  sd2 

      fall. The patient reports that her shoes stick to the floor sometimes and caused her to     

      fall hitting her head. She denies any loss of consciousness and recalls the entire          

      event. She complains of pain to her right hip, right ankle and right elbow. She was         

      brought in on a backboard. She denies any neck or back pain currently. She mainly           

      complains of pain to the right hip which is worsened with any palpation or motion. She      

      did not receive any medications with EMS prior to arrival..                                 

                                                                                                  

Historical:                                                                                       

- Home Meds:                                                                                      

19:01 bupropion HCl 75 mg Oral tab twice a day [Active]; desvenlafaxine 50 mg Oral tr24 1 tab eh3 

      once daily [Active]; sodium chloride 1 gram Oral tab daily [Active];                        

- PMHx:                                                                                           

19:01 Dementia;                                                                               eh3 

                                                                                                  

- Immunization history:: Adult Immunizations up to date.                                          

- Social history:: Smoking status: unknown.                                                       

                                                                                                  

                                                                                                  

ROS:                                                                                              

19:25 Constitutional: Negative for fever, chills, and weight loss, Eyes: Negative for injury, sd2 

      pain, redness, and discharge, Cardiovascular: Negative for chest pain, palpitations,        

      and edema, Respiratory: Negative for shortness of breath, cough, wheezing. Abdomen/GI:      

      Negative for abdominal pain, nausea, vomiting, diarrhea. MS/Extremity: Positive for         

      injury. Negative for deformity. Skin: Negative for injury, rash, and discoloration,         

      Neuro: Negative for headache, numbness and tingling.                                        

                                                                                                  

Exam:                                                                                             

19:25 Constitutional:  This is a well developed, well nourished patient who is awake, alert,  sd2 

      and in no acute distress. Head/Face:  Normocephalic, atraumatic. Eyes:  EOMI, normal        

      conjunctiva bilaterally Chest/axilla:  Normal chest wall appearance and motion.             

      Nontender with no deformity.   Cardiovascular:  Regular rate and rhythm with a normal       

      S1 and S2.  No gallops, murmurs, or rubs.  2+ distal pulses. Respiratory:  Lungs have       

      equal breath sounds bilaterally, clear to auscultation and percussion.  No rales,           

      rhonchi or wheezes noted.  No increased work of breathing, no retractions or nasal          

      flaring. Abdomen/GI:  Soft, non-tender, with normal bowel sounds. No guarding or            

      rebound.  No evidence of tenderness throughout. Skin:  Warm, dry with normal turgor.        

      Normal color with no rashes, no lesions, and no evidence of cellulitis. MS/ Extremity:      

      Pulses equal, no cyanosis.  Neurovascular intact.  Limited ROM of R hip 2/2 pain.           

      Abrasions noted to posterior R elbow. 2+ distal pulses. Psych:  Awake, alert, Behavior,     

      mood, and affect are within normal limits.                                                  

                                                                                                  

Vital Signs:                                                                                      

18:55  / 84; Pulse 60; Resp 20; Temp 97.8(O); Pulse Ox 97% on R/A; Pain 8/10;           kj1 

19:05  / 83; Pulse 59; Resp 20; Pulse Ox 97% on R/A; Weight 77.11 kg; Height 5 ft. 3    eh3 

      in. (160.02 cm);                                                                            

20:00  / 86; Pulse 62; Resp 18; Pulse Ox 97% on R/A;                                    eh3 

21:00  / 58; Pulse 96; Resp 18; Pulse Ox 95% on R/A;                                    eh3 

22:00  / 75; Pulse 95; Resp 18; Pulse Ox 95% on R/A;                                    eh3 

23:00  / 74; Pulse 71; Resp 18; Pulse Ox 96% on R/A;                                    eh3 

19:05 Body Mass Index 30.11 (77.11 kg, 160.02 cm)                                             eh3 

                                                                                                  

MDM:                                                                                              

19:05 Patient medically screened.                                                             sd2 

19:25 Differential diagnosis: Differential diagnosis includes but is not limited to:          sd2 

      Fracture, contusion, abrasion, closed head injury, pneumothorax, intra-abdominal            

      injury, intracranial hemorrhage, spinal injury among others. Data reviewed: vital           

      signs, nurses notes.                                                                        

20:54 ED course: Spoke with Nathen Malcolm, patient's son and MPOA on the phone, as patient was  sd2 

      refusing medication and CT scan but does have dementia and does not appear to be making     

      rational decisions. MPOA has given consent to proceed with giving the medication and        

      performing the CT scan. .                                                                   

22:18 Data reviewed: lab test result(s), radiologic studies. Counseling: I had a detailed     sd2 

      discussion with the patient and/or guardian regarding: the historical points, exam          

      findings, and any diagnostic results supporting the discharge/admit diagnosis, lab          

      results, radiology results, the need for further work-up and treatment in the hospital.     

      ED course: Discussed case with Dr. Pinzon, Orthopedics, non-operative. Will consult and       

      see patient in the morning. Discussed case with Dr. Archuleta who will admit the patient to      

      his service..                                                                               

                                                                                                  

10/19                                                                                             

19:17 Order name: CBC with Diff; Complete Time: 19:49                                         sd2 

10/19                                                                                             

19:17 Order name: BMP; Complete Time: 20:14                                                   sd2 

10/19                                                                                             

19:17 Order name: PT-INR; Complete Time: 20:14                                                sd2 

10/19                                                                                             

19:17 Order name: Ptt, Activated; Complete Time: 20:14                                        sd2 

10/19                                                                                             

22:18 Order name: SARS RAPID                                                                  sd2 

10/19                                                                                             

22:34 Order name: Basic Metabolic Panel                                                       EDMS

10/19                                                                                             

19:16 Order name: CT Head C Spine; Complete Time: 20:14                                       sd2 

10/19                                                                                             

19:16 Order name: XRAY Ankle RIGHT 3 view; Complete Time: 20:14                               sd2 

10/19                                                                                             

19:16 Order name: XRAY Hip RIGHT 2 view; Complete Time: 20:14                                 sd2 

10/19                                                                                             

19:16 Order name: XRAY Elbow RIGHT 3 view; Complete Time: 20:14                               sd2 

10/19                                                                                             

20:15 Order name: CT Pelvis wo Cont                                                           sd2 

10/19                                                                                             

22:34 Order name: Basic Metabolic Panel                                                       EDMS

10/19                                                                                             

22:34 Order name: CBC with Automated Diff                                                     EDMS

10/19                                                                                             

22:34 Order name: CBC with Automated Diff                                                     EDMS

10/19                                                                                             

20:19 Order name: Pelvis Wo Cont; Complete Time: 22:04                                        EDMS

10/19                                                                                             

22:31 Order name: CONS Physician Consult                                                      EDMS

10/19                                                                                             

22:34 Order name: Heart Healthy                                                               EDMS

                                                                                                  

Administered Medications:                                                                         

19:22 Drug: fentaNYL (PF) 50 mcg Route: IVP; Site: right antecubital;                         eh3 

20:42 Follow up: Response: Pain is unchanged, physician notified                              3 

21:27 Drug: morphine 4 mg Route: IVP; Infused Over: 4 mins; Site: right antecubital;          kl  

22:19 Follow up: Response: No adverse reaction                                                3 

22:20 Follow up: Response: Pain is unchanged, physician notified                              3 

21:27 Drug: Zofran (Ondansetron) 4 mg Route: IVP; Site: right antecubital;                    kl  

22:20 Follow up: Response: No adverse reaction                                                3 

                                                                                                  

                                                                                                  

Disposition Summary:                                                                              

10/19/22 22:21                                                                                    

Hospitalization Ordered                                                                           

      Hospitalization Status: Inpatient Admission                                             sd2 

      Provider: MACKENZIE Archuleta 

      Location: [facility] (Inpatient)                                                 sd2 

      Condition: Stable                                                                       sd2 

      Problem: new                                                                            sd2 

      Symptoms: have improved                                                                 sd2 

      Bed/Room Type: Standard                                                                 sd2 

      Room Assignment: 208(10/19/22 23:19)                                                    cg  

      Diagnosis                                                                                   

        - Mechanical Fall                                                                     sd2 

        - Closed left superior and inferior pubic rami fractures                              sd2 

        - Closed Fifth metatarsal fracture, right                                             sd2 

      Forms:                                                                                      

        - Medication Reconciliation Form                                                      sd2 

        - SBAR form                                                                           sd2 

Signatures:                                                                                       

Dispatcher [employer]                           EDAspen Houston RN RN kl Garcia, Cindy, RN RN cg Hall, Erin, RN RN   [facility]                                                  

Porsche Martin MD MD   sd2                                                  

                                                                                                  

Corrections: (The following items were deleted from the chart)                                    

: 22:21 sd2                                                                               cg  

                                                                                                  

**************************************************************************************************

## 2022-10-19 NOTE — RAD REPORT
EXAM DESCRIPTION:  CT - Pelvis Wo Cont - 10/19/2022 9:48 pm

 

CLINICAL HISTORY:  R hip pain s/p fall

Fall, right hip pain

 

COMPARISON:  Hip Right 2 View dated 10/19/2022

 

TECHNIQUE:  All CT scans are performed using dose optimization technique as appropriate and may inclu
de automated exposure control or mA/KV adjustment according to patient size.

 

FINDINGS:  Diffuse osteopenia is noted.

 

Fracture is present involving the right sacral ala. Mildly comminuted fracture involves the superior 
pubic ramus on the right extending into the pubic symphysis. There is moderate adjacent anterior pelv
ic hematoma.

 

Fracture of the inferior pubic ramus is also present in two parts. Mild adjacent hematoma is present 
medially.

 

No left hemipelvis fracture seen.

 

Both hip joints are intact.

 

IMPRESSION:  Fracture of the superior and inferior pubic rami on the right as detailed above. Moderat
e adjacent pelvic hematoma.

 

Fracture right sacral ala is also present.

 

Both hip joints are intact.

## 2022-10-19 NOTE — RAD REPORT
EXAM DESCRIPTION:  RAD - Hip Right 2 View - 10/19/2022 7:46 pm

 

CLINICAL HISTORY:  PAIN

 

COMPARISON:  Hip Right 2 View dated 12/21/2014

 

FINDINGS:  Diffuse osteopenia is seen. No fracture is apparent. Given the high degree of osteopenia, 
if pain persists, recommend CT or MR imaging.

## 2022-10-19 NOTE — RAD REPORT
EXAM DESCRIPTION:  RAD - Elbow Right 3 View - 10/19/2022 7:46 pm

 

CLINICAL HISTORY:  PAIN

 

COMPARISON:  No comparisons

 

FINDINGS:  Prominent diffuse osteopenia is noted. No acute fracture or dislocation.

## 2022-10-20 LAB
BUN BLD-MCNC: 19 MG/DL (ref 7–18)
GLUCOSE SERPLBLD-MCNC: 146 MG/DL (ref 74–106)
HCT VFR BLD CALC: 43.3 % (ref 36–45)
LYMPHOCYTES # SPEC AUTO: 0.9 K/UL (ref 0.7–4.9)
MCV RBC: 93.4 FL (ref 80–100)
PMV BLD: 7.9 FL (ref 7.6–11.3)
POTASSIUM SERPL-SCNC: 4.5 MMOL/L (ref 3.5–5.1)
RBC # BLD: 4.64 M/UL (ref 3.86–4.86)

## 2022-10-20 RX ADMIN — SENNOSIDES AND DOCUSATE SODIUM SCH TAB: 8.6; 5 TABLET ORAL at 20:15

## 2022-10-20 RX ADMIN — AMLODIPINE BESYLATE SCH MG: 2.5 TABLET ORAL at 08:04

## 2022-10-20 RX ADMIN — METOPROLOL TARTRATE SCH MG: 50 TABLET, FILM COATED ORAL at 08:04

## 2022-10-20 RX ADMIN — MORPHINE SULFATE PRN MG: 4 INJECTION, SOLUTION INTRAMUSCULAR; INTRAVENOUS at 16:47

## 2022-10-20 RX ADMIN — HYDROCODONE BITARTRATE AND ACETAMINOPHEN SCH TAB: 5; 325 TABLET ORAL at 13:27

## 2022-10-20 RX ADMIN — VALSARTAN SCH MG: 80 TABLET ORAL at 08:03

## 2022-10-20 RX ADMIN — MORPHINE SULFATE PRN MG: 2 INJECTION, SOLUTION INTRAMUSCULAR; INTRAVENOUS at 08:04

## 2022-10-20 RX ADMIN — ATORVASTATIN CALCIUM SCH MG: 40 TABLET, FILM COATED ORAL at 20:15

## 2022-10-20 RX ADMIN — MORPHINE SULFATE PRN MG: 2 INJECTION, SOLUTION INTRAMUSCULAR; INTRAVENOUS at 22:55

## 2022-10-20 RX ADMIN — HYDROCODONE BITARTRATE AND ACETAMINOPHEN SCH TAB: 5; 325 TABLET ORAL at 20:15

## 2022-10-20 RX ADMIN — SODIUM CHLORIDE TAB 1 GM SCH GM: 1 TAB at 16:48

## 2022-10-20 RX ADMIN — Medication SCH ML: at 08:06

## 2022-10-20 RX ADMIN — AMLODIPINE BESYLATE SCH MG: 2.5 TABLET ORAL at 20:16

## 2022-10-20 RX ADMIN — METOPROLOL TARTRATE SCH MG: 50 TABLET, FILM COATED ORAL at 20:15

## 2022-10-20 RX ADMIN — HYDROCODONE BITARTRATE AND ACETAMINOPHEN SCH TAB: 5; 325 TABLET ORAL at 09:03

## 2022-10-20 RX ADMIN — MORPHINE SULFATE PRN MG: 4 INJECTION, SOLUTION INTRAMUSCULAR; INTRAVENOUS at 00:45

## 2022-10-20 RX ADMIN — MORPHINE SULFATE PRN MG: 2 INJECTION, SOLUTION INTRAMUSCULAR; INTRAVENOUS at 12:06

## 2022-10-20 RX ADMIN — SENNOSIDES AND DOCUSATE SODIUM SCH TAB: 8.6; 5 TABLET ORAL at 09:04

## 2022-10-20 NOTE — HP
Date of Admission:  10/19/2022



Chief Complaint:  Fall; back pain, hip pain, and foot pain.



History Of Present Illness:  This is an 81-year-old female patient, who saw cardiologist yesterday, DAYANARA Lin, after she had abnormal stress test at his office and she was scheduled to have outpatient 
cardiac cath procedure to be done on Friday which is tomorrow.  The patient lives here at home and 
amber has some caregiver assisting her at home.  She has 2 sons.  They both live out of town, one lives i
[facility], other one in [place], and the patient's both sons, have medical power of  for [facility].  The patient was in hospital about a week ago and ever since she went home, she has been having s
ignificant problem with agitation at home.  She was getting upset at her family members and her careg
iver to the extent that son informed me this morning that one of her best caregivers decided to quit 
working for her because of the patient's behavior problem and she continued to have this significant 
behavior issues in last one week, and the patient's son from [place] has been here with her trying to 
help her.  All this information and details were obtained this morning by talking to patient's son, rajat villarreal lives in [place].  He also informed me that both sons collectively have made a decision that it is 
not safe for the patient to drive her car anymore and they have taken her car away and they are tryin
g to figure out how to provide care in the best possible way and their goal is to keep patient at [facility] with 24-hour care at home, if at all possible.  The patient's son also informed me that in the past
 while patient was living in [place] for few years during her few hospital admissions, she had signifi
cant problem with agitation, some confusions in the hospital to the extent that one time they had to 
involve the psychiatrist in the hospital and the patient's son had to override the patient's decision
 to leave hospital and they had to override and keep her in the hospital after communicating with the
 psychiatrist.  So, what it appears that the patient has ongoing significant psychiatric issues and s
he has not been under care of psychiatrist.  She recently moved back to [place] and during her [facility] hospital admission, we noted significant problem with agitation and behavior problems that I sugg
ested to patient's son that on outpatient basis, the patient should start to see psychiatrist.  Mckenna joshi yesterday after patient had argument with her son, she was walking around in the house, very ang
ry, and as she was trying to take a step in the right lateral direction, her foot, kind of twisted, a
nd she lost balance and fell on the right side and after that she was complaining of back pain, hip p
ain, and foot pain and she was brought into emergency room.  In the emergency room, further evaluatio
n revealed presence of fracture of pelvis, fracture of sacral ala, and fracture of right foot fifth m
etatarsal bone.  Even from emergency room, patient wanted to go home and the patient's son, who was w
ith her in the emergency room, decided to override her decision and agreed with the ER physician to a
dmit her to the hospital, and I was contacted.  She was admitted to the hospital under my service.  P
ain medication was ordered overnight.  This morning when I saw her, she was sleeping, but easily arou
sable.  Even this morning when I saw her, she was asking me and thinking that she will be going home 
today and obviously, I had to explain it to her that, that will not be possible.



Allergies:  BUSPIRONE AND SHE DESCRIBES FEELING CRAZY AFTER SHE TOOK THAT MEDICATION; AND SHE IS LIST
ED AS ALLERGIC TO CIPRO, WHICH CAUSED CHEST PAIN TYPE OF SIDE EFFECT.



Review of Systems:

Musculoskeletal:  As mentioned above. 

Psychiatry:  As mentioned above. 



All other systems reviewed and negative.



Medications:  Atorvastatin 40 mg daily at bedtime, amlodipine 2.5 mg 2 times a day, metoprolol 50 mg 
2 times a day, olmesartan 40 mg daily, bupropion 75 mg 2 times a day, Pristiq 100 mg daily, and sodiu
m chloride 1 g 2 times a day, and aspirin 81 mg daily.



Past Medical History:  Significant for hyponatremia, allergic rhinitis, hypertension, hyperlipidemia,
 diverticulosis, hyperthyroidism, anxiety, depression, osteoporosis, hyponatremia, and recent problem
 with chest pain and we have concern about underlying coronary artery disease on basis of abnormal st
ress test, which was done this week with Dr. Fischer.



Past Surgical History:  Significant for tonsillectomy, back surgery, knee surgery, removal of lipoma 
from back.



Family History:  Father  from aortic aneurysm.  Mother , had dementia.



Social History:  Negative for smoking and alcohol use.



Physical Examination:

Vital Signs:  This morning temperature 97.8, pulse 67, respiratory rate 16, blood pressure 142/89, ox
ygen saturation 96%, height 5 feet 3 inches, weight 170 pounds. 

General:  Awake, alert, oriented, not in distress. 

HEENT:  Head atraumatic, normocephalic.  Conjunctivae nonerythematous.  Sclerae white.  Mouth, no thr
ush or edema noted.  Ears/Nose, no mass, lesion, discharge noted. 

Neck:  Supple. No JVD, lymph nodes, bruit, thyromegaly noted. 

Lungs:  Bilateral good equal air entry. Clear to auscultation. No rhonchi.  No rales. 

Heart:  Normal heart sounds, no murmur or gallop. 

Abdomen:  Soft, bowel sounds normal. No guarding, rigidity, tenderness, mass, hepatosplenomegaly, dis
tention, or bruit noted. 

Extremities:  No leg edema.  No calf tenderness. 

Skin:  No rash, ulcer, cellulitis. 

Lymphatics:  No lymph node enlargement in neck, supraclavicular, infraclavicular region. 

Neuro:  No focal neurological deficit. 

Chest:  Unremarkable. 

External Genitalia:  Deferred. 

Rectal:  Deferred.



Laboratory Data:  Yesterday; white count 10.7, hemoglobin 15.5, platelets 234.  This morning, white c
ount 10.2, hemoglobin 14.5, platelets 197.  Yesterday; sodium 135, potassium 4.2, chloride 102, bicar
b 28, BUN 18, creatinine 0.89, glucose 106.  This morning; sodium 135, potassium 4.5, chloride 101, b
icarb 31, BUN 19, creatinine 0.83, glucose 146.  Pro-time 10.7, INR 0.97.  CAT scan of the pelvis rojelio
ws fracture of the superior and inferior pubic rami on the right side with moderate adjacent pelvic h
ematoma, fracture of the right sacral ala also present.  Both hip joints are intact.  X-ray of the [facility] ankle shows fracture of right fifth metatarsal.



Impression:  

1.Fracture, right superior and inferior pubic rami.

2.Fracture, right sacral ala.

3.Fracture, right fifth metatarsal.

4.Hyponatremia.

5.Probable coronary artery disease.

6.Anxiety.

7.Depression.

8.Hypertension.

9.Hyperlipidemia.

10.Aortic valve regurgitation.

11.Diverticulosis.



Plan:  Admit patient to hospital for further evaluation and management of this problem.  The patient 
is appropriate for inpatient and is expected to spend 2 midnights in hospital.  The patient's son has
 informed me that the family has really concern about her ability to take her medications correctly b
ecause they are scattered all over the house and they are not sure if she is taking it as prescribed 
on a regular basis or not.  They also have concerns about her living at home by herself, so they are 
going to make arrangements for 24-hour care at home.  We did talk about treatment plan, which right n
ow, we will consult Orthopedic surgeon and as far as treatment is concerned, we will go ahead and coretta
t for Orthopedic surgeon's evaluation, likely it is going to be conservative treatment and that means
 time, physical therapy, and pain medication is what she will need and over period of time, we expect
 her condition to continue to improve.  As far as physical therapy is concerned, different options in
cluding inpatient rehab as one possibility and other possibility will be to go to skilled nursing [facility], if inpatient rehab stay is denied by her insurance.  Other reason for denial for inpatient cass
ab is, if the patient is not willing to participate or not able to do 3 hours of physical therapy.  P
ain medication, morphine along with Zofran for nausea.  This morning, I discussed with the patient sergo romero informed her that I would like for her to take oral pain medication around the clock, so that way s
he has some continuous pain relief and then we can still use morphine type of medication on an as nee
ded basis.  Stool softener was ordered.  SCD was ordered for DVT prophylaxis.  For blood pressure con
trol, we will continue her antihypertensive medication.  For hyperlipidemia, we will continue her sta
tin therapy.  The patient's son was advised to bring her Bupropion and Pristiq from home, so we can c
ontinue those medications using her home supply.  Hemoglobin is stable.  So at this point, no concern
 about any excessive bleeding in pelvic hematoma.  The patient's son has medical power of  an
d he was advised to bring us a copy to keep it in her file at the hospital.  During the course of day
 today she had lot 

of agitation and Ativan 80 was ordered when nurse contacted me for that.  I will see her tomorrow for
 followup.





GABY/MODL

DD:  10/20/2022 20:20:19Voice ID:  [id number]

## 2022-10-21 LAB
ALBUMIN SERPL BCP-MCNC: 3.4 G/DL (ref 3.4–5)
ALP SERPL-CCNC: 71 U/L (ref 45–117)
ALT SERPL W P-5'-P-CCNC: 23 U/L (ref 12–78)
AST SERPL W P-5'-P-CCNC: 11 U/L (ref 15–37)
BUN BLD-MCNC: 19 MG/DL (ref 7–18)
GLUCOSE SERPLBLD-MCNC: 116 MG/DL (ref 74–106)
HCT VFR BLD CALC: 44 % (ref 36–45)
LYMPHOCYTES # SPEC AUTO: 1.1 K/UL (ref 0.7–4.9)
MAGNESIUM SERPL-MCNC: 2.2 MG/DL (ref 1.8–2.4)
MCV RBC: 92.8 FL (ref 80–100)
PMV BLD: 8.5 FL (ref 7.6–11.3)
POTASSIUM SERPL-SCNC: 4.2 MMOL/L (ref 3.5–5.1)
RBC # BLD: 4.74 M/UL (ref 3.86–4.86)

## 2022-10-21 RX ADMIN — SENNOSIDES AND DOCUSATE SODIUM SCH TAB: 8.6; 5 TABLET ORAL at 19:49

## 2022-10-21 RX ADMIN — MORPHINE SULFATE PRN MG: 4 INJECTION, SOLUTION INTRAMUSCULAR; INTRAVENOUS at 16:32

## 2022-10-21 RX ADMIN — ASPIRIN SCH MG: 81 TABLET, COATED ORAL at 08:20

## 2022-10-21 RX ADMIN — SENNOSIDES AND DOCUSATE SODIUM SCH TAB: 8.6; 5 TABLET ORAL at 08:16

## 2022-10-21 RX ADMIN — SODIUM CHLORIDE TAB 1 GM SCH GM: 1 TAB at 08:17

## 2022-10-21 RX ADMIN — HYDROCODONE BITARTRATE AND ACETAMINOPHEN SCH TAB: 5; 325 TABLET ORAL at 19:50

## 2022-10-21 RX ADMIN — METOPROLOL TARTRATE SCH MG: 50 TABLET, FILM COATED ORAL at 19:50

## 2022-10-21 RX ADMIN — SODIUM CHLORIDE TAB 1 GM SCH GM: 1 TAB at 15:54

## 2022-10-21 RX ADMIN — MORPHINE SULFATE PRN MG: 4 INJECTION, SOLUTION INTRAMUSCULAR; INTRAVENOUS at 06:31

## 2022-10-21 RX ADMIN — MORPHINE SULFATE PRN MG: 2 INJECTION, SOLUTION INTRAMUSCULAR; INTRAVENOUS at 04:14

## 2022-10-21 RX ADMIN — Medication SCH ML: at 03:27

## 2022-10-21 RX ADMIN — AMLODIPINE BESYLATE SCH MG: 2.5 TABLET ORAL at 08:15

## 2022-10-21 RX ADMIN — AMLODIPINE BESYLATE SCH MG: 2.5 TABLET ORAL at 19:50

## 2022-10-21 RX ADMIN — HYDROCODONE BITARTRATE AND ACETAMINOPHEN SCH TAB: 5; 325 TABLET ORAL at 08:17

## 2022-10-21 RX ADMIN — HYDROCODONE BITARTRATE AND ACETAMINOPHEN SCH: 5; 325 TABLET ORAL at 14:00

## 2022-10-21 RX ADMIN — Medication SCH ML: at 19:51

## 2022-10-21 RX ADMIN — ATORVASTATIN CALCIUM SCH MG: 40 TABLET, FILM COATED ORAL at 19:50

## 2022-10-21 RX ADMIN — METOPROLOL TARTRATE SCH MG: 50 TABLET, FILM COATED ORAL at 08:16

## 2022-10-21 RX ADMIN — ONDANSETRON PRN MG: 2 INJECTION INTRAMUSCULAR; INTRAVENOUS at 17:30

## 2022-10-21 RX ADMIN — Medication SCH ML: at 08:17

## 2022-10-21 RX ADMIN — MORPHINE SULFATE PRN MG: 4 INJECTION, SOLUTION INTRAMUSCULAR; INTRAVENOUS at 11:45

## 2022-10-21 RX ADMIN — ENOXAPARIN SODIUM SCH MG: 40 INJECTION SUBCUTANEOUS at 08:20

## 2022-10-21 RX ADMIN — QUETIAPINE SCH MG: 25 TABLET ORAL at 19:49

## 2022-10-21 RX ADMIN — HYDROCODONE BITARTRATE AND ACETAMINOPHEN SCH TAB: 5; 325 TABLET ORAL at 15:53

## 2022-10-21 RX ADMIN — VALSARTAN SCH MG: 80 TABLET ORAL at 08:16

## 2022-10-21 NOTE — PN
Date of Progress Note:  10/21/2022



Subjective:  The patient was seen this morning for followup.  She was lying in bed, not in any distre
ss.  She did require some Ativan yesterday per order because of anxiety and agitation, and this uzair
abraham, when I walked into her room, caregiver was with her and nurse was with her in the room.  The randall
ent requested to talk to me by myself, so caregiver and nurse both stepped outside, and I spent a lot
 of time talking to her.  The patient has significant problem with depression and anxiety, and after 
losing her  and 1 son, she really has not recovered well from the loss and during lot of times
 with today's hospital visit, she was crying as she was talking to me.  She obviously wants to go [facility] as soon as possible and I informed her that I will be glad to discharge her when medically it is ap
propriate and safe for her to go home.  I explained it to her regarding need for medications to help 
her with anxiety, depression, and also importance of her seeing the psychiatrist, and she is willing 
to see a psychiatrist to get some help also.  We talked about importance of physical therapy as well,
 and I did try to  her as much as I could today regarding loss of her  and son, and hop
e that we made some progress with the visit that I had with her today.



Objective:  Vital Signs:  Reviewed. 

HEENT:  Unremarkable. 

Lungs:  Clear to auscultation. 

Heart:  Sounds normal. 

Abdomen:  Soft.  Bowel sounds normal.  No guarding, rigidity, tenderness, distention. 

Extremities:  No leg edema.



Laboratory Data:  White count 10.5, hemoglobin 14.7, platelets __________.  Sodium 135, potassium 4.2
, chloride 99, bicarb 29, BUN 19, creatinine 0.76, glucose 116.  Liver function tests unremarkable.



Impression:  

1.Fracture, right superior and inferior pubic rami.

2.Fracture, right sacral ala.

3.Fracture, right fifth metatarsal bone.

4.Hyponatremia.

5.Hypertension.

6.Probable coronary artery disease.

7.Hyperlipidemia.

8.Anxiety.

9.Depression.



Plan:  We will go ahead and continue current antihypertensive medication, statin therapy.  Hemoglobin
 is stable.  There is no clinical concern about any significant blood loss with the pelvic hematoma, 
which was noted on the initial CAT scan when she came to emergency room, so as of today, we will star
t her on aspirin 81 mg daily and Lovenox 40 mg subcutaneous injection daily for DVT prophylaxis as kvng barone is at high risk of DVT.  Physical therapy to work with the patient, and I have also requested c
onsultation for inpatient rehab.  I will try to reach out to psychiatrist, Dr. Hernandez, to see if he 
is able to provide consultation for this patient while she is in the hospital and his help will be ve
ry much appreciated.





GABY/OSKAR

DD:  10/21/2022 10:12:35Voice ID:  [id number]

DT:  10/21/2022 10:47:09Report ID:  [id number]

## 2022-10-21 NOTE — CON
Date of Consultation:  10/20/2022



Reason For Consultation:  Right hip pain.



History Of Present Illness:  Ms. Malcolm is an 81-year-old female with dementia who presented to the E
 after sustaining a fall onto her right side with subsequent pain.  She was seen in the emergency ro
om and had x-rays and CT scan, which demonstrated a right-sided pelvic ring injury.  She also had neg
ative x-rays of her right elbow and right ankle.  The patient does have some underlying confusion and
 was unable to get any significant history from the patient.



Review of Systems:

As above.



Past Medical History:  Includes dementia.



Medications:  Home medications are bupropion, venlafaxine, and sodium chloride.



Social History:  No tobacco or alcohol use.



Physical Examination:

General:  No apparent distress. 

HEENT:  Normocephalic, atraumatic. 

Neck:  Supple. 

Cardiovascular:  Brisk capillary refill to all digits. 

Chest:  Nonlabored breathing. 

Abdomen:  Nondistended. 

Psychiatric:  With some confusion, responds to some questions. 

Musculoskeletal:  Right lower extremity:  No pain with internal or external rotation of the hip.  Antonio
e pain with hip flexion and abduction of the hip.  No tenderness over the knee, tibia, or ankle.  Rig
ht upper extremity:  Some ecchymosis over the posterior aspect of the elbow; however, no pain with ra
nge of motion of the elbow.  Left upper extremity:  Functional range of motion without pain.  No osman
s deformities.  No obvious dislocations.  Left lower extremity:  Functional range of motion without p
ain.  No gross deformities.  There were no obvious dislocations.



Diagnostic Studies:  X-rays and CT scan demonstrate a right-sided pubic rami fracture and also right-
sided sacral ala fracture.



Assessment And Plan:  Ms. Malcolm is an 81-year-old female with right-sided pelvic ring injury.  No anguiano
rgical intervention is indicated at this time.  Patient may work with Physical Therapy to aid with mo
bilization.  The patient may be weightbearing as tolerated on the right lower extremity.  She will fo
llow up in my clinic in 2 weeks for re-evaluation and x-rays of the pelvis.





CV/MODL

DD:  10/20/2022 22:44:53Voice ID:  [id number]

DT:  10/21/2022 02:23:54Report ID:  [id number]

## 2022-10-21 NOTE — RAD REPORT
EXAM DESCRIPTION:  RAD - Foot Right 3 View - 10/21/2022 1:27 pm

 

CLINICAL HISTORY:  pain

 

COMPARISON:  <Comparisons>None.

 

FINDINGS:  No fracture, dislocation or periosteal reaction. No acute or destructive bone process iden
tifiable. There is postsurgical change and bony remodeling in the distal first metatarsal. The second
- fourth PIP joints are fused. IP joint degenerative changes are mild. There are mild degenerative ch
anges the first MTP joint as well as at the tarsal - metatarsal articulations. A small plantar spur i
s present.

 

No air or foreign body in the soft tissues.

 

IMPRESSION:  Degenerative and postsurgical changes are present in the foot as detailed. No acute bone
 or joint finding identified.

## 2022-10-22 RX ADMIN — SODIUM CHLORIDE TAB 1 GM SCH GM: 1 TAB at 08:03

## 2022-10-22 RX ADMIN — HYDROCODONE BITARTRATE AND ACETAMINOPHEN SCH TAB: 5; 325 TABLET ORAL at 08:03

## 2022-10-22 RX ADMIN — AMLODIPINE BESYLATE SCH MG: 2.5 TABLET ORAL at 08:04

## 2022-10-22 RX ADMIN — MORPHINE SULFATE PRN MG: 2 INJECTION, SOLUTION INTRAMUSCULAR; INTRAVENOUS at 04:13

## 2022-10-22 RX ADMIN — Medication SCH ML: at 19:39

## 2022-10-22 RX ADMIN — QUETIAPINE SCH MG: 25 TABLET ORAL at 19:38

## 2022-10-22 RX ADMIN — ATORVASTATIN CALCIUM SCH MG: 40 TABLET, FILM COATED ORAL at 19:37

## 2022-10-22 RX ADMIN — ASPIRIN SCH MG: 81 TABLET, COATED ORAL at 08:03

## 2022-10-22 RX ADMIN — SENNOSIDES AND DOCUSATE SODIUM SCH TAB: 8.6; 5 TABLET ORAL at 08:04

## 2022-10-22 RX ADMIN — SODIUM CHLORIDE TAB 1 GM SCH: 1 TAB at 17:00

## 2022-10-22 RX ADMIN — ENOXAPARIN SODIUM SCH MG: 40 INJECTION SUBCUTANEOUS at 08:03

## 2022-10-22 RX ADMIN — METOPROLOL TARTRATE SCH MG: 50 TABLET, FILM COATED ORAL at 19:37

## 2022-10-22 RX ADMIN — Medication SCH ML: at 08:04

## 2022-10-22 RX ADMIN — VALSARTAN SCH MG: 80 TABLET ORAL at 19:38

## 2022-10-22 RX ADMIN — VALSARTAN SCH MG: 80 TABLET ORAL at 08:03

## 2022-10-22 RX ADMIN — SENNOSIDES AND DOCUSATE SODIUM SCH TAB: 8.6; 5 TABLET ORAL at 19:37

## 2022-10-22 RX ADMIN — METOPROLOL TARTRATE SCH MG: 50 TABLET, FILM COATED ORAL at 08:03

## 2022-10-22 RX ADMIN — HYDROCODONE BITARTRATE AND ACETAMINOPHEN SCH TAB: 5; 325 TABLET ORAL at 19:39

## 2022-10-22 RX ADMIN — AMLODIPINE BESYLATE SCH MG: 2.5 TABLET ORAL at 19:41

## 2022-10-22 RX ADMIN — HYDROCODONE BITARTRATE AND ACETAMINOPHEN SCH TAB: 5; 325 TABLET ORAL at 14:04

## 2022-10-22 NOTE — PN
Date of Progress Note:  10/22/2022



Subjective:  The patient was seen this morning for followup.  No new complaints or problems reported 
by her.  She was sitting at the bedside.  Two physical therapists were present on each side and her kisha knight was also present in the room.  She has 24-hour caregiver present in her room and when I saw 
her, she started crying and she is missing her dogs at home and she wants to go home.



Objective:  Vital Signs:  Reviewed. 

HEENT:  Unremarkable. 

Lungs:  Clear to auscultation. 

Heart:  Sounds normal. 

Abdomen:  Soft.  Bowel sounds normal.  No guarding, rigidity, tenderness, or distention. 

Extremities:  No leg edema.



Impression:  

1.Fracture, right superior and inferior pubic rami.

2.Fracture, right sacral ala.

3.Depression.

4.Anxiety.

5.Hypertension.

6.Probable coronary artery disease.



Plan:  We will go ahead and continue current antidepressant medication.  We will continue Norco 5 mg 
3 times a day, which is scheduled pain medication for her and we also have morphine for p.r.n. use an
d I will discontinue 4 mg dose and only give 2 mg every 4 hours as needed for pain and we will also c
ontinue nausea medication as she needs it.  Continue DVT prophylaxis, continue statin therapy, antihy
pertensive medication, and aspirin per order.  Yesterday, I did call our psychiatrist, Dr. David romero left a message at his office and on his cellphone for him to call me back to see if he will be shakira mtz to assist with this patient's care in the hospital and I am waiting on the response back from h
im.  I did talk to the patient once again today the importance of participating with Physical Therapy
 and informed her that I guess she will be able to go home at some point, but in order for her to go 
home safely, she will need to be able to stand up and ambulate with minimal assistance or no assistan
ce and that will happen with time and physical therapy, and this was discussed with her today as well
 as yesterday.  Yesterday, she did require some IV Ativan for agitation and anxiety and yesterday mary gentile we also added Seroquel 25 mg at bedtime.  We will repeat blood work tomorrow.  I will see her to
stacey for followup.





GABY/MODL

DD:  10/22/2022 11:07:12Voice ID:  [id number]

DT:  10/22/2022 11:18:19Report ID:  [id number]

## 2022-10-23 LAB
BUN BLD-MCNC: 35 MG/DL (ref 7–18)
GLUCOSE SERPLBLD-MCNC: 146 MG/DL (ref 74–106)
HCT VFR BLD CALC: 34.8 % (ref 36–45)
LYMPHOCYTES # SPEC AUTO: 1 K/UL (ref 0.7–4.9)
MAGNESIUM SERPL-MCNC: 2.2 MG/DL (ref 1.8–2.4)
MCV RBC: 91.9 FL (ref 80–100)
PMV BLD: 7.7 FL (ref 7.6–11.3)
POTASSIUM SERPL-SCNC: 3.8 MMOL/L (ref 3.5–5.1)
RBC # BLD: 3.79 M/UL (ref 3.86–4.86)

## 2022-10-23 RX ADMIN — SENNOSIDES AND DOCUSATE SODIUM SCH TAB: 8.6; 5 TABLET ORAL at 08:46

## 2022-10-23 RX ADMIN — AMLODIPINE BESYLATE SCH: 2.5 TABLET ORAL at 21:00

## 2022-10-23 RX ADMIN — VALSARTAN SCH: 80 TABLET ORAL at 21:00

## 2022-10-23 RX ADMIN — Medication SCH ML: at 19:39

## 2022-10-23 RX ADMIN — SENNOSIDES AND DOCUSATE SODIUM SCH TAB: 8.6; 5 TABLET ORAL at 19:40

## 2022-10-23 RX ADMIN — METOPROLOL TARTRATE SCH: 50 TABLET, FILM COATED ORAL at 21:00

## 2022-10-23 RX ADMIN — SODIUM CHLORIDE TAB 1 GM SCH GM: 1 TAB at 16:01

## 2022-10-23 RX ADMIN — VALSARTAN SCH MG: 80 TABLET ORAL at 08:45

## 2022-10-23 RX ADMIN — Medication SCH ML: at 13:16

## 2022-10-23 RX ADMIN — HYDROCODONE BITARTRATE AND ACETAMINOPHEN SCH: 5; 325 TABLET ORAL at 13:16

## 2022-10-23 RX ADMIN — ENOXAPARIN SODIUM SCH MG: 40 INJECTION SUBCUTANEOUS at 08:49

## 2022-10-23 RX ADMIN — Medication SCH EA: at 10:47

## 2022-10-23 RX ADMIN — HYDROCODONE BITARTRATE AND ACETAMINOPHEN PRN TAB: 5; 325 TABLET ORAL at 20:33

## 2022-10-23 RX ADMIN — AMLODIPINE BESYLATE SCH MG: 2.5 TABLET ORAL at 08:46

## 2022-10-23 RX ADMIN — SODIUM CHLORIDE TAB 1 GM SCH GM: 1 TAB at 08:46

## 2022-10-23 RX ADMIN — Medication SCH EA: at 10:48

## 2022-10-23 RX ADMIN — Medication SCH ML: at 19:40

## 2022-10-23 RX ADMIN — MORPHINE SULFATE PRN MG: 2 INJECTION, SOLUTION INTRAMUSCULAR; INTRAVENOUS at 03:32

## 2022-10-23 RX ADMIN — Medication SCH ML: at 08:49

## 2022-10-23 RX ADMIN — METOPROLOL TARTRATE SCH MG: 50 TABLET, FILM COATED ORAL at 08:45

## 2022-10-23 RX ADMIN — Medication SCH EA: at 19:40

## 2022-10-23 RX ADMIN — ASPIRIN SCH MG: 81 TABLET, COATED ORAL at 08:44

## 2022-10-23 RX ADMIN — QUETIAPINE SCH MG: 25 TABLET ORAL at 19:40

## 2022-10-23 RX ADMIN — ATORVASTATIN CALCIUM SCH MG: 40 TABLET, FILM COATED ORAL at 19:39

## 2022-10-23 RX ADMIN — HYDROCODONE BITARTRATE AND ACETAMINOPHEN SCH: 5; 325 TABLET ORAL at 08:48

## 2022-10-23 NOTE — PN
Date of Progress Note:  10/23/2022



Subjective:  The patient was seen this morning for followup.  She was lying in bed, not in distress. 
 Her caregiver was with her at bedside.  Her oral intake of food and water is not adequate.  Vital si
gns reviewed as per my discussion with the nursing staff.  She has received 2 doses of morphine in la
st 24 hours and 3 doses of Ativan in last 24 hours.  This morning when I saw her, she was not crying,
 appears weaker than normal.



Objective:  Vital Signs:  Reviewed. 

HEENT:  Examination unremarkable. 

Lungs:  Clear to auscultation. 

Heart:  Sounds normal. 

Abdomen:  Soft.  Bowel sounds normal.  No guarding, rigidity, tenderness, or distention. 

Extremities:  No leg edema.



Laboratory Data:  White count 10.2, hemoglobin 12.1, platelets 185.  __________, chloride 101, bicarb
 28, BUN 35, creatinine 1.02, glucose 146, magnesium 2.2.



Impression:  

1.Fracture, right superior and inferior pubic rami.

2.Fracture, right sacral ala.

3.Volume depletion.

4.Anxiety.

5.Depression.

6.Hypertension.

7.Probable coronary artery disease.



Plan:  We will go ahead and encourage the patient to drink 40-50 ounce of water a day.  I have advise
d nursing staff to go ahead and give her Ensure supplement 3 times a day.  We will reduce dose of Ati
van from 1 

mg every 6 hours to 0.5 mg every 6 hours as needed.  Continue current pain medications and I will see
 her tomorrow for followup.





GABY/MODL

DD:  10/23/2022 10:47:54Voice ID:  [id number]

DT:  10/23/2022 15:48:20Report ID:  [id number]

## 2022-10-24 RX ADMIN — Medication SCH ML: at 21:16

## 2022-10-24 RX ADMIN — Medication SCH EA: at 10:40

## 2022-10-24 RX ADMIN — SODIUM CHLORIDE TAB 1 GM SCH: 1 TAB at 08:00

## 2022-10-24 RX ADMIN — SODIUM CHLORIDE TAB 1 GM SCH GM: 1 TAB at 10:39

## 2022-10-24 RX ADMIN — VALSARTAN SCH: 80 TABLET ORAL at 09:00

## 2022-10-24 RX ADMIN — Medication SCH ML: at 09:45

## 2022-10-24 RX ADMIN — SODIUM CHLORIDE TAB 1 GM SCH GM: 1 TAB at 16:32

## 2022-10-24 RX ADMIN — ASPIRIN SCH: 81 TABLET, COATED ORAL at 09:00

## 2022-10-24 RX ADMIN — Medication SCH EA: at 21:15

## 2022-10-24 RX ADMIN — AMLODIPINE BESYLATE SCH: 2.5 TABLET ORAL at 21:00

## 2022-10-24 RX ADMIN — Medication SCH EA: at 10:41

## 2022-10-24 RX ADMIN — ENOXAPARIN SODIUM SCH MG: 40 INJECTION SUBCUTANEOUS at 09:45

## 2022-10-24 RX ADMIN — Medication SCH: at 09:00

## 2022-10-24 RX ADMIN — TRAZODONE HYDROCHLORIDE PRN MG: 50 TABLET ORAL at 22:34

## 2022-10-24 RX ADMIN — Medication SCH ML: at 14:10

## 2022-10-24 RX ADMIN — SENNOSIDES AND DOCUSATE SODIUM SCH TAB: 8.6; 5 TABLET ORAL at 10:38

## 2022-10-24 RX ADMIN — SENNOSIDES AND DOCUSATE SODIUM SCH TAB: 8.6; 5 TABLET ORAL at 21:14

## 2022-10-24 RX ADMIN — ATORVASTATIN CALCIUM SCH MG: 40 TABLET, FILM COATED ORAL at 21:15

## 2022-10-24 RX ADMIN — AMLODIPINE BESYLATE SCH: 2.5 TABLET ORAL at 09:00

## 2022-10-24 RX ADMIN — METOPROLOL TARTRATE SCH: 50 TABLET, FILM COATED ORAL at 09:00

## 2022-10-24 RX ADMIN — ASPIRIN SCH MG: 81 TABLET, COATED ORAL at 10:39

## 2022-10-24 RX ADMIN — Medication SCH ML: at 10:42

## 2022-10-24 RX ADMIN — HYDROCODONE BITARTRATE AND ACETAMINOPHEN PRN TAB: 5; 325 TABLET ORAL at 16:30

## 2022-10-24 RX ADMIN — SENNOSIDES AND DOCUSATE SODIUM SCH: 8.6; 5 TABLET ORAL at 09:00

## 2022-10-25 LAB
ALBUMIN SERPL BCP-MCNC: 2.7 G/DL (ref 3.4–5)
ALP SERPL-CCNC: 55 U/L (ref 45–117)
ALT SERPL W P-5'-P-CCNC: 23 U/L (ref 12–78)
AST SERPL W P-5'-P-CCNC: 26 U/L (ref 15–37)
BUN BLD-MCNC: 45 MG/DL (ref 7–18)
BUN BLD-MCNC: 54 MG/DL (ref 7–18)
BUN BLD-MCNC: 59 MG/DL (ref 7–18)
GLUCOSE SERPLBLD-MCNC: 106 MG/DL (ref 74–106)
GLUCOSE SERPLBLD-MCNC: 124 MG/DL (ref 74–106)
GLUCOSE SERPLBLD-MCNC: 130 MG/DL (ref 74–106)
HCT VFR BLD CALC: 28.1 % (ref 36–45)
HCT VFR BLD CALC: 29.1 % (ref 36–45)
HCT VFR BLD CALC: 30.1 % (ref 36–45)
LYMPHOCYTES # SPEC AUTO: 0.7 K/UL (ref 0.7–4.9)
LYMPHOCYTES # SPEC AUTO: 0.7 K/UL (ref 0.7–4.9)
MAGNESIUM SERPL-MCNC: 2.4 MG/DL (ref 1.8–2.4)
MAGNESIUM SERPL-MCNC: 2.4 MG/DL (ref 1.8–2.4)
MCV RBC: 92.5 FL (ref 80–100)
MCV RBC: 93.2 FL (ref 80–100)
PMV BLD: 8.2 FL (ref 7.6–11.3)
PMV BLD: 8.3 FL (ref 7.6–11.3)
POTASSIUM SERPL-SCNC: 3.8 MMOL/L (ref 3.5–5.1)
POTASSIUM SERPL-SCNC: 3.9 MMOL/L (ref 3.5–5.1)
POTASSIUM SERPL-SCNC: 4.3 MMOL/L (ref 3.5–5.1)
RBC # BLD: 3.02 M/UL (ref 3.86–4.86)
RBC # BLD: 3.15 M/UL (ref 3.86–4.86)

## 2022-10-25 RX ADMIN — SODIUM CHLORIDE TAB 1 GM SCH GM: 1 TAB at 18:11

## 2022-10-25 RX ADMIN — Medication SCH: at 21:00

## 2022-10-25 RX ADMIN — AMLODIPINE BESYLATE SCH: 2.5 TABLET ORAL at 09:00

## 2022-10-25 RX ADMIN — METOPROLOL TARTRATE SCH MG: 25 TABLET ORAL at 13:39

## 2022-10-25 RX ADMIN — SODIUM CHLORIDE SCH MLS: 0.9 INJECTION, SOLUTION INTRAVENOUS at 11:28

## 2022-10-25 RX ADMIN — Medication SCH: at 14:00

## 2022-10-25 RX ADMIN — SENNOSIDES AND DOCUSATE SODIUM SCH: 8.6; 5 TABLET ORAL at 09:00

## 2022-10-25 RX ADMIN — SODIUM CHLORIDE TAB 1 GM SCH GM: 1 TAB at 08:00

## 2022-10-25 RX ADMIN — SENNOSIDES AND DOCUSATE SODIUM SCH: 8.6; 5 TABLET ORAL at 21:00

## 2022-10-25 RX ADMIN — Medication SCH: at 09:00

## 2022-10-25 RX ADMIN — METOPROLOL TARTRATE SCH: 25 TABLET ORAL at 17:00

## 2022-10-25 RX ADMIN — METOPROLOL TARTRATE SCH MG: 25 TABLET ORAL at 20:46

## 2022-10-25 RX ADMIN — Medication SCH ML: at 21:00

## 2022-10-25 RX ADMIN — ATORVASTATIN CALCIUM SCH MG: 40 TABLET, FILM COATED ORAL at 20:46

## 2022-10-25 RX ADMIN — Medication SCH ML: at 09:00

## 2022-10-25 RX ADMIN — HYDROCODONE BITARTRATE AND ACETAMINOPHEN PRN TAB: 5; 325 TABLET ORAL at 11:42

## 2022-10-25 RX ADMIN — ESCITALOPRAM OXALATE SCH: 20 TABLET, FILM COATED ORAL at 09:00

## 2022-10-25 RX ADMIN — TRAZODONE HYDROCHLORIDE PRN MG: 50 TABLET ORAL at 22:51

## 2022-10-25 RX ADMIN — AMLODIPINE BESYLATE SCH MG: 2.5 TABLET ORAL at 20:45

## 2022-10-25 NOTE — CON
Date of Consultation:  10/25/2022



Reason For Consultation:  Atrial fibrillation with rapid ventricular response.



History Of Present Illness:  This is an 81-year-old female.  Apparently, the patient sustained a fall
 at home, so she was hospitalized.  Apparently, she went into rapid atrial fibrillation, blood pressu
re was slightly low.  She was given a dose of digoxin by Dr. Archuleta and I was consulted.  Started on am
iodarone drip and her heart rate is slowing down nicely.  Son is at bedside.  Denies having any chest
 pain.



Past Medical History:  Significant for hypertension, dyslipidemia, hypothyroidism.



Medications:  Refer to reconciliation sheet for detailed list.



Allergies:  NO KNOWN DRUG ALLERGIES.



Family History:  No premature coronary artery disease or cancer.



Social History:  Does not smoke or drink.  Does not drink.



Review of Systems:

All systems reviewed and they were negative except what mentioned in HPI.



Physical Examination:

Vital Signs:  Reviewed. 

Head and Neck:  Pupils are equal, reactive to light.  Intact eye movements.  No JVD.  No cervical lym
phadenopathy.  Neck is supple.  Thyroid is not enlarged. 

Lungs:  Clear to auscultation bilaterally.  No rhonchi, wheezing, or crackles.  No accessory muscle u
se. 

Heart:  Irregularly irregular.  No extra sounds. 

Abdomen:  Soft, nontender.  Bowel sounds positive.  No organomegaly.  No masses or hernia.  No rigidi
ty or rebound. 

Extremities:  No clubbing or cyanosis.  Intact pulses. 

Skin:  No rash. 

Neurologic:  Alert, awake, oriented x3.  No acute focal deficits appreciated.



Investigations:  BUN is 54, creatinine is 0.9.  Troponin 164.



Assessment And Recommendations:  

1.Atrial fibrillation with rapid ventricular response.  Started her on amiodarone load.  Heart rate 
started to come down nicely and then introduced metoprolol 25 mg by mouth q.8 hours and we will plan 
to use metoprolol IV 5 mg as needed for rate control for heart rate above 100.

2.Elevated troponin.  The patient does not have any chest pain, but had abnormal stress test recentl
y.  We will plan on attempting to get her heart rate down first and then start her on aspirin and do 
cardiac enzymes as a trend troponin check on her.  Further recommendations to follow based on the tro
ponin results.

3.Dyslipidemia.  Continue statin.





SR/MODL

DD:  10/25/2022 14:06:40Voice ID:  [id number]

DT:  10/25/2022 15:14:12Report ID:  [id number]

## 2022-10-25 NOTE — EKG
Test Date:    2022-10-25               Test Time:    10:13:17

Technician:   MG                                     

                                                     

MEASUREMENT RESULTS:                                       

Intervals:                                           

Rate:         151                                    

MA:                                                  

QRSD:         74                                     

QT:           272                                    

QTc:          431                                    

Axis:                                                

P:                                                   

MA:                                                  

QRS:          46                                     

T:            233                                    

                                                     

INTERPRETIVE STATEMENTS:                                       

                                                     

Atrial fibrillation with rapid ventricular response

ST & T wave abnormality, consider inferolateral ischemia or digitalis

effect

Abnormal ECG

Compared to ECG 10/13/2022 13:23:36

Possible ischemia now present

Sinus tachycardia no longer present

ST (T wave) deviation still present



Electronically Signed On 10-25-22 13:54:46 CDT by Arnie Lin

## 2022-10-26 LAB
BUN BLD-MCNC: 36 MG/DL (ref 7–18)
GLUCOSE SERPLBLD-MCNC: 110 MG/DL (ref 74–106)
HCT VFR BLD CALC: 27.9 % (ref 36–45)
LYMPHOCYTES # SPEC AUTO: 1 K/UL (ref 0.7–4.9)
MAGNESIUM SERPL-MCNC: 2.3 MG/DL (ref 1.8–2.4)
MCV RBC: 93.1 FL (ref 80–100)
PMV BLD: 8 FL (ref 7.6–11.3)
POTASSIUM SERPL-SCNC: 3.9 MMOL/L (ref 3.5–5.1)
RBC # BLD: 2.99 M/UL (ref 3.86–4.86)
TROPONIN I SERPL HS-MCNC: 547.8 PG/ML (ref ?–58.9)

## 2022-10-26 RX ADMIN — METOPROLOL TARTRATE SCH MG: 25 TABLET ORAL at 20:12

## 2022-10-26 RX ADMIN — MAGNESIUM HYDROXIDE PRN ML: 400 SUSPENSION ORAL at 20:11

## 2022-10-26 RX ADMIN — ESCITALOPRAM OXALATE SCH MG: 20 TABLET, FILM COATED ORAL at 12:40

## 2022-10-26 RX ADMIN — Medication SCH ML: at 20:13

## 2022-10-26 RX ADMIN — DEXTROSE SCH: 5 SOLUTION INTRAVENOUS at 12:00

## 2022-10-26 RX ADMIN — SODIUM CHLORIDE SCH MLS: 0.9 INJECTION, SOLUTION INTRAVENOUS at 01:05

## 2022-10-26 RX ADMIN — DEXTROSE SCH MLS: 5 SOLUTION INTRAVENOUS at 11:48

## 2022-10-26 RX ADMIN — AMIODARONE HYDROCHLORIDE SCH MG: 200 TABLET ORAL at 14:42

## 2022-10-26 RX ADMIN — AMIODARONE HYDROCHLORIDE SCH MG: 200 TABLET ORAL at 20:11

## 2022-10-26 RX ADMIN — SODIUM CHLORIDE TAB 1 GM SCH GM: 1 TAB at 12:41

## 2022-10-26 RX ADMIN — METOPROLOL TARTRATE SCH MG: 25 TABLET ORAL at 12:45

## 2022-10-26 RX ADMIN — Medication SCH: at 20:13

## 2022-10-26 RX ADMIN — Medication SCH ML: at 12:42

## 2022-10-26 RX ADMIN — HYDROCODONE BITARTRATE AND ACETAMINOPHEN PRN TAB: 5; 325 TABLET ORAL at 20:11

## 2022-10-26 RX ADMIN — SENNOSIDES AND DOCUSATE SODIUM SCH TAB: 8.6; 5 TABLET ORAL at 20:12

## 2022-10-26 RX ADMIN — SODIUM CHLORIDE TAB 1 GM SCH GM: 1 TAB at 16:10

## 2022-10-26 RX ADMIN — Medication SCH: at 13:03

## 2022-10-26 RX ADMIN — SODIUM CHLORIDE SCH MLS: 0.9 INJECTION, SOLUTION INTRAVENOUS at 07:00

## 2022-10-26 RX ADMIN — SENNOSIDES AND DOCUSATE SODIUM SCH TAB: 8.6; 5 TABLET ORAL at 12:40

## 2022-10-26 RX ADMIN — SODIUM CHLORIDE SCH MG: 0.9 INJECTION, SOLUTION INTRAVENOUS at 20:20

## 2022-10-26 RX ADMIN — ATORVASTATIN CALCIUM SCH MG: 40 TABLET, FILM COATED ORAL at 20:13

## 2022-10-26 RX ADMIN — ASPIRIN SCH MG: 81 TABLET, COATED ORAL at 12:41

## 2022-10-26 RX ADMIN — Medication SCH: at 09:00

## 2022-10-26 NOTE — PN
Date of Progress Note:  10/26/2022



Subjective:  The patient was seen this morning for followup.  She was sleeping, not in any distress. 
 Her caregiver and son were present at bedside.  Last night, the patient had significant problem with
 agitation and she pulled her IVs out.  PICC line was ordered last night and Geodon 10 mg IM x1 dose 
was ordered and nursing staff had to put her in restraints because of severe agitation.  Dr. Hernandez 
also ordered a 2 mg dose of IV Ativan for p.r.n. use.  The patient did not go to sleep until about 3 
to 4 o'clock this morning, so when I was there this morning around 6:30 a.m., patient was sleeping.



Objective:  Vital Signs:  Reviewed. 

HEENT:  Unremarkable. 

Lungs:  Clear to auscultation.  Not using any accessory muscles of respiration. 

Heart:  Sounds normal. 

Abdomen:  Bowel sounds normoactive.  No distention. 

Extremities:  No leg edema.



Laboratory Data:  White count 11.1, hemoglobin 9.2, platelets 200.  Sodium 142, potassium 3.9, chlori
de 112, bicarb 24, BUN 36, creatinine 0.66, glucose 110.  Troponin this morning was 547.  Yesterday e
vening troponin was 726 and yesterday morning it was 164.  CAT scan of the abdomen and pelvis done to
day shows evidence of pelvis fracture and small pelvic hematoma, unchanged from before.



Impression:  

1.Atrial fibrillation, converted to sinus rhythm with IV amiodarone.

2.Volume depletion.

3.Hypotension.

4.Fracture, right superior and inferior pubic rami.

5.Fracture, right sacral ala.

6.Anxiety.

7.Depression.

8.Rule out dementia.



Plan:  We will go ahead and continue amiodarone per Dr. Lin.  I had a long discussion with the hetal gomes's son this morning regarding plan of treatment.  He informed me that the patient and family woul
d really like for her to have not necessarily long life, but whatever time she has, they would like t
o have quality life.  Son also informed me that the patient very likely will not do well in a nursing
 home situation and I am definitely concerned that if she goes to nursing home, which ideally she rojelio
uld, but if she goes to nursing home there is very good possibility that nursing home will not be abl
e to keep her there, just because of her severe agitation problem and confusion problems that we are 
dealing with here in the hospital almost on a daily basis and nursing home will probably end up sendi
abraham her right back to the emergency room.  So with that in mind, sending her to nursing home is probab
ly not the best idea.  She will do better at home than any other place.  Going to inpatient rehab traci
or on the hospital, is not an option because she is not able to participate for 3 hours of daily phys
ical therapy.  Son also informed me that she does not have option of moving into either [place] or [facility] where her 2 sons live, and she will have to continue to stay here in this area with a 24-yunier
r caregiver at home, so what I did inform the patient's son this morning that fall risk and injuries 
are our biggest concern and he will need to make sure that the caregiver that she is going to have at
 home, they are comfortable assisting her, and instead of one caregiver, she may need two caregivers 
for safety reasons.  He understands that and he will communicate that with care provider services.  T
he patient will need home health care and home physical therapy, which can be arranged with help of S
ocial Services and I have asked the son to communicate with Social Service to make arrangements for d
ischarge planning.  Given all this different complexity and options, the patient's best option is to 
go back home upon discharge with 24-hour caregiver services and home health and home physical therapy
.  At present time, she is not stable for discharge, but we are hoping that over a period of next few
 days, we should be able to do so.  Dr. Lin will start her on oral amiodarone once IV amiodarone i
s given over a 24-hour period.  Also communicated with Dr. Lin about long-term anticoagulation the
rapy and after explaining it to him regarding the patient's fall and pelvis fracture as well as fract
ure of the right sacral ala that required this hospital admission.  Dr. Lin has informed me that t
he patient's risk is more than the benefit from anticoagulation therapy and I agree with that, so he 
has recommended not to do full-dose anticoagulation therapy and upon discharge, the patient should ju
st stay on aspirin 81 mg daily.  Currently, we have her on aspirin.  As of today, I have started her 
on Lovenox 40 mg 

subcutaneous injection daily for DVT prophylaxis.  I will see her tomorrow for followup.





GABY/MODL

DD:  10/26/2022 19:19:41Voice ID:  [id number]

DT:  10/26/2022 21:16:15Report ID:  [id number]

## 2022-10-26 NOTE — RAD REPORT
EXAM DESCRIPTION:  CTAbdomen   Pelvis Wo Contrast - 10/26/2022 12:20 pm

 

CLINICAL HISTORY:

pelvic hematoma, follow up

 

COMPARISON:  Pelvis Wo Cont dated 10/19/2022; Hip Right 2 View dated 10/19/2022

 

TECHNIQUE:  CT of the abdomen and pelvis was performed.

 

All CT scans are performed using dose optimization technique as appropriate and may include automated
 exposure control or mA/KV adjustment according to patient size.

 

FINDINGS:  Lower chest: Dependent atelectasis. Small hiatal hernia. Question thickened distal esophag
us.

Liver: No acute abnormality or suspicious lesions.

Biliary: Sludge versus noncalcified stones.

Stomach: No significant focal abnormality.

Duodenum: No significant focal abnormality.

Pancreas: No significant abnormality.

Spleen: No significant abnormality.

Adrenal: No suspicious lesions.

Kidney/ureter: No hydronephrosis. No renal calculi.

Retroperitoneum: No retroperitoneal adenopathy.

Vascular: No aneurysm. Atherosclerosis.

Bowel: No significant focal abnormality.

Peritoneum: No ascites or free air.

Bladder: Grossly unremarkable.

Reproductive: No adnexal masses. Hysterectomy.

Bones: Re- demonstrated mildly displaced right obturator ring fracture and nondisplaced right sacral 
fracture. The pelvic hematoma is similar. Increased presacral and perirectal edema. Mild to moderate 
formed stool at the lower rectum.

Other: n/a

 

IMPRESSION:  Right obturator ring and sacral fracture with similar alignment. The pelvic hematoma is 
similar in size. Mild increased presacral and perirectal edema which may be related to the underlying
 sacral fracture and/or impacted feces at the rectum.

## 2022-10-26 NOTE — PN
Date of Progress Note:  10/26/2022



Subjective:  Seen by bedside.  Doing better.  She converted to sinus rhythm and from that perspective
, she is doing well.



Review of Systems:

The patient is a very poor historian.  No reported problems with chest pain; however, she is getting 
confused and combative at time.  All other systems reviewed and they were negative.



Physical Examination:

Vital Signs:  Reviewed. 

Head and Neck:  Pupils are equal, reactive to light.  Intact eye movements.  No JVD.  No cervical lym
phadenopathy.  Neck is supple.  Thyroid is not enlarged. 

Lungs:  Clear to auscultation bilaterally.  No rhonchi, wheezing, or crackles.  No accessory muscle u
se. 

Heart:  Regular rate and rhythm.  No extra sounds. 

Abdomen:  Soft, nontender.  Bowel sounds positive.  No organomegaly.  No masses or hernia.  No rigidi
ty or rebound. 

Extremities:  No edema, clubbing, or cyanosis.  Intact pulses. 

Skin:  No rash. 

Neurologic:  Alert, awake, with confusion.  No acute focal deficits appreciated. 

Lymph Nodes:  No cervical or axillary lymphadenopathy.



Investigations:  Her troponin peaked at 726 and now down to 547.



Assessment And Recommendations:  

1.Atrial fibrillation, converted to sinus rhythm.  Switch amiodarone to 200 mg by mouth twice a day 
and continue with metoprolol.  The patient will need to be placed on anticoagulation for stroke preve
ntion.

2.Elevated troponin with recent known abnormal stress test.  She will need coronary angiogram.  Once
 the patient's mental status clears 

and more stable, then we will plan for coronary angiogram on her, which we could possibly do by Samantha moon SR/OSKAR

DD:  10/26/2022 13:57:30Voice ID:  [id number]

DT:  10/26/2022 15:30:05Report ID:  [id number]

## 2022-10-26 NOTE — PN
Date of Progress Note:  10/25/2022



Subjective:  The patient was seen 2 times today, first in the morning and later this evening.  When I
 saw her this morning, she was lying in bed, sleeping.  Her caregiver was with her.  She was not in a
ny distress and she woke up.  Denied any complaints.



Objective:  Vital Signs:  Reviewed. 

HEENT:  Unremarkable. 

Lungs:  Clear to auscultation. 

Heart:  Sounds normal. 

Abdomen:  Soft.  Bowel sounds normal.  No guarding, rigidity, tenderness, distention. 

Extremities:  No leg edema.



Hospital Course:  After I saw her today this morning, I noted her blood work this morning.  White cou
nt was 10, hemoglobin was 10 and day before yesterday hemoglobin was 12.1.  Platelet count today was 
189.  Her BUN today had gone up to 59, creatinine 1.05 on the chemistry.  With this, we decided to go
 ahead and encourage oral liquids as we have done it in last 2 days, but at the same time decided to 
start her on IV fluid and plan was to do CAT scan of the abdomen and pelvis for followup on a small p
elvic hematoma that was noted with the pelvis fracture to make sure that has not gotten any bigger or
 any other retroperitoneal hematoma type of complications in view of her anemia problem.  The patient
 did go down for CAT scan, but before we started doing CAT scan, she got extremely agitated and she w
as not cooperative at all for the CAT scan, so the patient did not get these tests done and she was b
rought back to the floor.  She received Ativan for her agitation.  Our plan was to try the CAT scan l
ater on.  Meanwhile, her blood pressure started going down, systolic blood pressure dropped down to a
s low as 70 and when nurse contacted me, the patient's legs were elevated at that time and IV fluid b
olus was ordered.  She received total of 1 L of IV fluid and she was ordered to be transferred to ICU
.  After she arrived in ICU, her systolic blood pressure remained stable around 110 or so.  The patie
nt was also in atrial fibrillation with rapid ventricular rate when she arrived in ICU with heart rat
e around 140 per minute.  Digoxin 0.25 mg 1 time IV dose was ordered and her troponin level which was
 ordered today came back 164.  Dr. Lin was requested to see her and he ordered IV amiodarone and a
lso later on, he ordered 1 dose of Lopressor when her systolic blood pressure was around 120 and afte
r that, her blood pressure did drop down again __________ advised nurse to communicate with Dr. Ancelmo presley to see what he wants to do next time.  In any case, her condition stabilized with IV fluid, she did
 not require any vasopressor medications.  Repeat blood work this evening; sodium 139, potassium 3.9,
 chloride 106, bicarb 26, BUN 54, creatinine 0.90, glucose 124.  Liver function tests unremarkable.  
Repeat troponin 726.  Repeat CBC this evening; white count 8.6, hemoglobin 9.6, platelets 206.  The p
eunice's son from [facility] was here earlier today.  I tried to contact her other son, who was not availa
ble and nurse was able to communicate with him and he requested for us to communicate with this other
 son from [place], who was physically present in ICU this evening and I did discuss with him and gave 
all the updates including abnormal cardiac enzymes.  We did not have second set of troponin level whe
n I saw him this evening and at that time, I explained to him that reason for abnormal cardiac enzyme
s could be demand ischemia versus myocardial infarction and we will trend the troponin level and foll
ow up with the cardiologist.



Impression:  

1.Fracture, right superior and inferior pubic rami.

2.Fracture, right sacral ala.

3.Anemia.

4.Volume depletion.

5.Atrial fibrillation with rapid ventricular rate.

6.Non-ST segment elevation myocardial infarction.

7.Coronary artery disease.

8.Anxiety.

9.Depression.



Plan:  We will go ahead and keep the patient in ICU.  At this point, she is on IV amiodarone drip and
 we will continue that.  We will give 1 dose of aspirin 81 mg tonight.  Continue medications as per p
sychiatrist, Dr. Hernandez.  After I saw her this evening, she had another episode of really bad agitat
ion and she does have periods of confusion that we have seen during this hospitalization from time to
 time.  There is definitely concern if she has some underlying dementia or not, but obviously it will
 be best evaluated in the outpatient setting when she is at her baseline.  We will try again tomorrow
 to 

see if we can try to get a CAT scan done.  Meanwhile, SCD was ordered for DVT prophylaxis.  We will r
epeat blood work tomorrow morning.





GABY/MODL

DD:  10/26/2022 05:22:39Voice ID:  [id number]

DT:  10/26/2022 09:15:59Report ID:  [id number]

## 2022-10-26 NOTE — CON
Date of Consultation:  10/24/2022



History Of Present Illness:  The patient was seen on second floor on account of 
worsening agitation and combativeness.  Interview was done in the patient's 
room.  History was provided mostly by the patient's caregiver.  On evaluation, 
patient was lying in bed however she is not able to fully participate with this 
evaluation due to her Dementia. Patient's home aid caregiver. She states patient
has become more agitated hence patient one of patient's son came down from from 
[place] to make arrangement for round the clock care as her needs has gotten 
worse. Caregiver states states she even more agitated as her son was trying to 
let her know the new arrangement and in the process stubbled and feel which lead
to the patient fracturing her hip. Patient's caregiver states since the 
incident, patient has become more emotional with frequent caring spells. She 
states since she started working as her caregiver this the most confused she has
seeing her. Patient had a son who was leaving with until last year when he 
passed away suddenly. Caregiver states patient still remember his death and 
cries about it. She states patients other 2 sons lives in [place] and [place]. No 
history of hallucination, no history of worsening disorientation, rapid change 
in patient mood or fluctuation in her level of consciousness. No history of 
alcohol or substance abuse.



Objective:  Vital Signs:  Blood pressure is 104/57, respiratory rate is 20, 
pulse rate is 86 beats per minute, O2 sat on room air is 95.



Mental Status Examination:  The patient is an 81-year-old  female, 
dressed in hospital gown, lying in bed with her caregiver at her side.  Patient 
was not in any obvious acute Cardiorespiratory distress. She is alert and 
oriented to person only.  Thought content; no delusional thinking.  No suicidal 
or homicidal ideation.  No visual or auditory hallucination noted.  The 
patient's insight and judgment cannot be assessed.  Patient unable to provide 
current answers to questions.



Diagnoses:  

1.    Neurocognitive disorder severe with behavioral disturbance.

2.                 Major depressive disorder unspecified

3.                 Anxiety disorder, unspecified.

4.                 Disappearance and death of a family member

5.   Insomnia.



Plan:  

1.   Recommend to discontinue Ativan for agitation.

2.   Recommend Zyprexa 2.5 mg PO Bid for agitation

3 .               Recommend  Trazodone 50 mg PO daily as needed for sleep

4.                Recommend   Lexapro 10 mg PO daily for depression and anxiety





KO/MODL

DD:  10/26/2022 06:29:46   Voice ID:  [id number]

DT:  10/26/2022 11:25:00   Report ID:  [id number]

SAFIA

## 2022-10-27 LAB
BUN BLD-MCNC: 27 MG/DL (ref 7–18)
GLUCOSE SERPLBLD-MCNC: 91 MG/DL (ref 74–106)
HCT VFR BLD CALC: 28.3 % (ref 36–45)
LYMPHOCYTES # SPEC AUTO: 1 K/UL (ref 0.7–4.9)
MCV RBC: 93.6 FL (ref 80–100)
PMV BLD: 8 FL (ref 7.6–11.3)
POTASSIUM SERPL-SCNC: 4.1 MMOL/L (ref 3.5–5.1)
RBC # BLD: 3.02 M/UL (ref 3.86–4.86)

## 2022-10-27 RX ADMIN — ESCITALOPRAM OXALATE SCH MG: 20 TABLET, FILM COATED ORAL at 08:57

## 2022-10-27 RX ADMIN — Medication SCH ML: at 09:00

## 2022-10-27 RX ADMIN — METOPROLOL TARTRATE SCH MG: 25 TABLET ORAL at 20:32

## 2022-10-27 RX ADMIN — SODIUM CHLORIDE TAB 1 GM SCH GM: 1 TAB at 08:58

## 2022-10-27 RX ADMIN — AMIODARONE HYDROCHLORIDE SCH MG: 200 TABLET ORAL at 08:57

## 2022-10-27 RX ADMIN — SENNOSIDES AND DOCUSATE SODIUM SCH TAB: 8.6; 5 TABLET ORAL at 20:31

## 2022-10-27 RX ADMIN — ENOXAPARIN SODIUM SCH MG: 40 INJECTION SUBCUTANEOUS at 08:57

## 2022-10-27 RX ADMIN — Medication SCH ML: at 16:22

## 2022-10-27 RX ADMIN — ONDANSETRON PRN MG: 2 INJECTION INTRAMUSCULAR; INTRAVENOUS at 10:30

## 2022-10-27 RX ADMIN — AMIODARONE HYDROCHLORIDE SCH MG: 200 TABLET ORAL at 20:32

## 2022-10-27 RX ADMIN — Medication SCH ML: at 20:35

## 2022-10-27 RX ADMIN — SODIUM CHLORIDE SCH MLS: 0.9 INJECTION, SOLUTION INTRAVENOUS at 00:40

## 2022-10-27 RX ADMIN — SODIUM CHLORIDE SCH: 0.9 INJECTION, SOLUTION INTRAVENOUS at 00:45

## 2022-10-27 RX ADMIN — METOPROLOL TARTRATE SCH MG: 25 TABLET ORAL at 09:01

## 2022-10-27 RX ADMIN — SENNOSIDES AND DOCUSATE SODIUM SCH TAB: 8.6; 5 TABLET ORAL at 08:57

## 2022-10-27 RX ADMIN — SODIUM CHLORIDE SCH MG: 0.9 INJECTION, SOLUTION INTRAVENOUS at 20:33

## 2022-10-27 RX ADMIN — MAGNESIUM HYDROXIDE PRN ML: 400 SUSPENSION ORAL at 09:24

## 2022-10-27 RX ADMIN — SODIUM CHLORIDE TAB 1 GM SCH GM: 1 TAB at 16:21

## 2022-10-27 RX ADMIN — ATORVASTATIN CALCIUM SCH MG: 40 TABLET, FILM COATED ORAL at 20:32

## 2022-10-27 RX ADMIN — ASPIRIN SCH MG: 81 TABLET, COATED ORAL at 08:57

## 2022-10-27 RX ADMIN — HYDROCODONE BITARTRATE AND ACETAMINOPHEN PRN TAB: 5; 325 TABLET ORAL at 11:33

## 2022-10-28 RX ADMIN — ATORVASTATIN CALCIUM SCH MG: 40 TABLET, FILM COATED ORAL at 20:38

## 2022-10-28 RX ADMIN — SODIUM CHLORIDE SCH MG: 0.9 INJECTION, SOLUTION INTRAVENOUS at 20:37

## 2022-10-28 RX ADMIN — Medication SCH: at 13:17

## 2022-10-28 RX ADMIN — SENNOSIDES AND DOCUSATE SODIUM SCH TAB: 8.6; 5 TABLET ORAL at 09:17

## 2022-10-28 RX ADMIN — AMIODARONE HYDROCHLORIDE SCH MG: 200 TABLET ORAL at 20:38

## 2022-10-28 RX ADMIN — ENOXAPARIN SODIUM SCH MG: 40 INJECTION SUBCUTANEOUS at 09:18

## 2022-10-28 RX ADMIN — Medication SCH ML: at 09:19

## 2022-10-28 RX ADMIN — Medication SCH ML: at 20:38

## 2022-10-28 RX ADMIN — METOPROLOL TARTRATE SCH MG: 25 TABLET ORAL at 09:18

## 2022-10-28 RX ADMIN — METOPROLOL TARTRATE SCH MG: 25 TABLET ORAL at 20:37

## 2022-10-28 RX ADMIN — ESCITALOPRAM OXALATE SCH MG: 20 TABLET, FILM COATED ORAL at 09:00

## 2022-10-28 RX ADMIN — ASPIRIN SCH MG: 81 TABLET, COATED ORAL at 09:17

## 2022-10-28 RX ADMIN — SODIUM CHLORIDE TAB 1 GM SCH GM: 1 TAB at 09:17

## 2022-10-28 RX ADMIN — SENNOSIDES AND DOCUSATE SODIUM SCH TAB: 8.6; 5 TABLET ORAL at 20:38

## 2022-10-28 RX ADMIN — ENOXAPARIN SODIUM SCH MG: 40 INJECTION SUBCUTANEOUS at 09:17

## 2022-10-28 RX ADMIN — MAGNESIUM HYDROXIDE PRN ML: 400 SUSPENSION ORAL at 15:41

## 2022-10-28 RX ADMIN — Medication SCH ML: at 09:17

## 2022-10-28 RX ADMIN — AMIODARONE HYDROCHLORIDE SCH MG: 200 TABLET ORAL at 09:17

## 2022-10-28 RX ADMIN — ONDANSETRON PRN MG: 2 INJECTION INTRAMUSCULAR; INTRAVENOUS at 19:14

## 2022-10-28 RX ADMIN — SODIUM CHLORIDE TAB 1 GM SCH GM: 1 TAB at 15:41

## 2022-10-29 LAB
ALBUMIN SERPL BCP-MCNC: 2.8 G/DL (ref 3.4–5)
ALP SERPL-CCNC: 76 U/L (ref 45–117)
ALT SERPL W P-5'-P-CCNC: 24 U/L (ref 12–78)
AST SERPL W P-5'-P-CCNC: 16 U/L (ref 15–37)
BUN BLD-MCNC: 15 MG/DL (ref 7–18)
GLUCOSE SERPLBLD-MCNC: 124 MG/DL (ref 74–106)
HCT VFR BLD CALC: 29.8 % (ref 36–45)
LYMPHOCYTES # SPEC AUTO: 0.7 K/UL (ref 0.7–4.9)
MCV RBC: 92.5 FL (ref 80–100)
PMV BLD: 7.7 FL (ref 7.6–11.3)
POTASSIUM SERPL-SCNC: 4.4 MMOL/L (ref 3.5–5.1)
RBC # BLD: 3.22 M/UL (ref 3.86–4.86)

## 2022-10-29 RX ADMIN — HYDROCODONE BITARTRATE AND ACETAMINOPHEN PRN TAB: 5; 325 TABLET ORAL at 23:49

## 2022-10-29 RX ADMIN — SENNOSIDES AND DOCUSATE SODIUM SCH TAB: 8.6; 5 TABLET ORAL at 20:51

## 2022-10-29 RX ADMIN — MAGNESIUM HYDROXIDE PRN ML: 400 SUSPENSION ORAL at 09:06

## 2022-10-29 RX ADMIN — HYDROCODONE BITARTRATE AND ACETAMINOPHEN PRN TAB: 5; 325 TABLET ORAL at 12:27

## 2022-10-29 RX ADMIN — ATORVASTATIN CALCIUM SCH MG: 40 TABLET, FILM COATED ORAL at 20:47

## 2022-10-29 RX ADMIN — METOPROLOL TARTRATE SCH MG: 25 TABLET ORAL at 09:07

## 2022-10-29 RX ADMIN — Medication SCH ML: at 20:52

## 2022-10-29 RX ADMIN — SODIUM CHLORIDE TAB 1 GM SCH GM: 1 TAB at 09:06

## 2022-10-29 RX ADMIN — AMIODARONE HYDROCHLORIDE SCH MG: 200 TABLET ORAL at 20:47

## 2022-10-29 RX ADMIN — Medication SCH ML: at 09:22

## 2022-10-29 RX ADMIN — SODIUM CHLORIDE TAB 1 GM SCH GM: 1 TAB at 17:02

## 2022-10-29 RX ADMIN — Medication SCH: at 09:00

## 2022-10-29 RX ADMIN — ASPIRIN SCH MG: 81 TABLET, COATED ORAL at 09:07

## 2022-10-29 RX ADMIN — AMIODARONE HYDROCHLORIDE SCH MG: 200 TABLET ORAL at 09:07

## 2022-10-29 RX ADMIN — ONDANSETRON PRN MG: 2 INJECTION INTRAMUSCULAR; INTRAVENOUS at 09:22

## 2022-10-29 RX ADMIN — SODIUM CHLORIDE SCH MG: 0.9 INJECTION, SOLUTION INTRAVENOUS at 20:52

## 2022-10-29 RX ADMIN — METOPROLOL TARTRATE SCH MG: 25 TABLET ORAL at 20:47

## 2022-10-29 RX ADMIN — ESCITALOPRAM OXALATE SCH MG: 20 TABLET, FILM COATED ORAL at 09:00

## 2022-10-29 RX ADMIN — SENNOSIDES AND DOCUSATE SODIUM SCH TAB: 8.6; 5 TABLET ORAL at 09:07

## 2022-10-29 NOTE — PN
Date of Progress Note:  10/28/2022



Subjective:  Seen by bedside.  There are no complaints of chest pain, shortness of breath, orthopnea,
 or cough.  No nausea, vomiting, or diarrhea.  All other systems were reviewed and they were negative
.



Physical Examination:

Vital Signs:  Temperature 97.8, pulse 64, breathing at 16, blood pressure is 109/69, saturating 95% o
n room air. 

General:  A pleasant elderly female, in no apparent distress. 

Head And Neck:  Pupils are equal and reactive to light.  Intact eye movements.  No JVD.  No cervical 
lymphadenopathy.  Neck is supple.  Thyroid is not enlarged. 

Lungs:  Clear to auscultation bilaterally.  No rhonchi, wheezing, or crackles.  No accessory muscle u
se. 

Heart:  Irregularly irregular.  No extra sounds. 

Abdomen:  Soft, nontender.  Bowel sounds positive.  No organomegaly.  No masses or hernia.  No rigidi
ty or rebound. 

Extremities:  No clubbing or cyanosis.  Intact pulses.  

Skin:  No rash. 

Neuro:  Alert, awake, and oriented x3.  No acute focal deficits appreciated.



Investigations:  BUN is 27, creatinine 0.65.  Hemoglobin is 9.5.



Assessment And Recommendations:  

1.Atrial fibrillation, now it is controlled.  Continue amiodarone and can decrease the dose to 200 m
g once a day and continue metoprolol 12.5 mg twice a day.  The patient is not a candidate for anticoa
gulation as she had a major recent fall.  For now to keep her on aspirin and plan for appendage closu
re as an outpatient, if after discussion with the family, as the patient has dementia, and doing cons
ervative management is reasonable. 

2.Elevated troponin, known to have abnormal stress test.  She would need a coronary angiogram; [facility], discussed the case with Dr. Archuleta.  At this point, with the dementia that she has, I will discuss 
the plan further with the family and allow her to be discharged and follow up as an outpatient.  If t
he family and the patient choose to proceed, then we will schedule left heart 

catheterization as an outpatient, but the patient needs some physical therapy in rehab at first.





SR/MODL

DD:  10/28/2022 16:20:52Voice ID:  [id number]

DT:  10/29/2022 02:37:50Report ID:  [id number]

## 2022-10-29 NOTE — PN
Date of Progress Note:  10/29/2022



Subjective:  Patient was seen this morning for followup.  She was happy, smiling.  Denied any complai
nts this morning.



Objective:  Vital Signs:  Reviewed. 

HEENT:  Unremarkable. 

Lungs:  Clear to auscultation. 

Heart:  Sounds normal. 

Abdomen:  Soft.  Bowel sounds normal.  No guarding, rigidity, tenderness, or distention. 

Extremities:  No leg edema.



Laboratory Data:  White count 10.4, hemoglobin 10.3, platelets 278.  Sodium 137, potassium 4.4, chlor
vamshi 103, bicarb 30, BUN 15, creatinine 0.76, glucose 124, total bilirubin 1.6.  AST and ALT normal.  
Abdominal ultrasound, limited to right upper quadrant was ordered today as patient had nausea and vom
iting episode 2 times after I saw her and it showed normal gallbladder and normal biliary tree ultras
ound.



Impression:  

1.Hypertension.

2.Constipation.

3.Nausea with vomiting.

4.Anxiety.

5.Depression.

6.Paroxysmal atrial fibrillation.

7.Fracture, right superior and inferior pubic rami.

8.Fracture, right sacral ala.



Plan:  We will go ahead and give Dulcolax rectal suppository for constipation problem.  Continue amio
darone, aspirin, and Lovenox per order.  We will go ahead and continue current escitalopram, Haldol, 
and Zyprexa per Dr. Hernandez.  The patient's blood pressure was elevated today and so far today we hav
e given 2 doses of amlodipine 5 mg each time and we will continue to monitor her blood pressure.  If 
necessary, we will have to continue to make adjustment on blood pressure medication.  Considering her
 current change in condition today, we will not be able to discharge her otherwise plan was to discha
rge her today, but I will reevaluate her tomorrow and depending on her condition, we may discharge he
r tomorrow to go home.





GABY/MODL

DD:  10/29/2022 17:14:44Voice ID:  [id number]

DT:  10/29/2022 22:45:53Report ID:  [id number]

## 2022-10-29 NOTE — PN
Date of Progress Note:  10/28/2022



Subjective:  Patient was seen this morning for followup.  She was sleeping.  Her caregiver was with h
er at bedside.  No new complaints or problems reported.  The patient was seen sometime after lunch an
d she did eat about 35% of her lunch as caregiver reported.  I did talk to physical therapist and he 
informed me that yesterday the patient was able to just stand up and not able to walk.



Physical Examination:

HEENT:  Unremarkable. 

Lungs:  Clear to auscultation. 

Heart:  Sounds normal. 

Abdomen:  Soft.  Bowel sounds normal.  No guarding, rigidity, tenderness, or distention. 

Extremities:  No leg edema.



Laboratory Data:  No new labs today.



Impression:  

1.Atrial fibrillation, paroxysmal, it is normal sinus rhythm now.

2.Anemia.

3.Volume depletion.

4.Anxiety.

5.Depression.

6.Hypertension.



Plan:  We will go ahead and continue current medication.  Continue current antihypertensive medicatio
n.  Continue current Haldol and escitalopram p.r.n. as well as Zyprexa per psychiatrist.  We will con
tinue current antihypertensive medication.  Continue aspirin as well as amiodarone and Lovenox per or
cm.  I did call the patient's son and discussed details with him.  The family has made arrangements 
for 24 hour care to be provided at home with caregiver services upon discharge and possible discharge
 this weekend and all those details were discussed with the patient's son this morning.  I will see 
er tomorrow for followup.  After I saw her, Physical Therapy did try to work with the patient and she
 was not able to ambulate.  So, the patient will need a wheelchair as recommended by Physical Therapy
, which has been requested and caregiver will need to assist her with transfer and we will arrange [facility] and home physical therapy.





GABY/MODL

DD:  10/29/2022 16:26:33Voice ID:  [id number]

DT:  10/29/2022 22:19:31Report ID:  [id number]

## 2022-10-29 NOTE — PN
Date of Progress Note:  10/27/2022



Subjective:  The patient was seen this morning for followup.  No new complaints or problems reported 
by her.  She was lying in bed not in distress, and she appeared very calm today and this is the best 
that I have seen her in last several days.  She is not appearing anxious at all, not crying, and had 
a smile on her face.  She denied any specific complaints.



Objective:  Vital Signs:  Reviewed. 

HEENT:  Examination unremarkable. 

Lungs:  Clear to auscultation. 

Heart:  Sounds normal. 

Abdomen:  Soft.  Bowel sounds normal.  No guarding, rigidity, tenderness, distention. 

Extremity:  No leg edema.



Laboratory Data:  White count 9.2, hemoglobin 9.5, platelets 206.  Sodium 140, potassium 4.1, chlorid
e 109, bicarb 25, BUN 27, creatinine 0.69, glucose 91.



Impression:  

1.Fracture, right superior and inferior pubic rami.

2.Fracture, right sacral ala.

3.Anemia.

4.Atrial fibrillation, paroxysmal.

5.Anxiety.

6.Depression.

7.Hypertension.



Plan:  The patient's hemoglobin is stable.  We will continue current Lovenox and continue aspirin.  P
hysical therapy to continue to work with the patient.  We will continue current pain medication which
 is hydrocodone on a p.r.n. basis.  She was encouraged to eat and drink as 

much as she can and participate with physical therapy.  I will see her tomorrow for followup.





GABY/MODL

DD:  10/29/2022 09:54:39Voice ID:  [id number]

DT:  10/29/2022 14:58:59Report ID:  [id number]

## 2022-10-29 NOTE — RAD REPORT
EXAM DESCRIPTION:  US - Abdomen Exam Limited - 10/29/2022 2:30 pm

 

CLINICAL HISTORY:  nausea, vomiting

 

COMPARISON:  Abdomen   Pelvis Wo Contrast dated 10/26/2022

 

FINDINGS:  No gallstones, sludge or other abnormalities within the gallbladder lumen. There is no wal
l thickening or pericholecystic fluid.

 

No common duct stone or biliary tree dilatation identified.

 

IMPRESSION:  Normal gallbladder and biliary tree ultrasound.

## 2022-10-30 VITALS — OXYGEN SATURATION: 93 %

## 2022-10-30 RX ADMIN — AMIODARONE HYDROCHLORIDE SCH MG: 200 TABLET ORAL at 09:02

## 2022-10-30 RX ADMIN — SODIUM CHLORIDE TAB 1 GM SCH GM: 1 TAB at 17:08

## 2022-10-30 RX ADMIN — METOPROLOL TARTRATE SCH MG: 25 TABLET ORAL at 21:00

## 2022-10-30 RX ADMIN — Medication SCH ML: at 09:03

## 2022-10-30 RX ADMIN — METOPROLOL TARTRATE SCH MG: 25 TABLET ORAL at 09:00

## 2022-10-30 RX ADMIN — MAGNESIUM HYDROXIDE PRN ML: 400 SUSPENSION ORAL at 08:59

## 2022-10-30 RX ADMIN — HYDROCODONE BITARTRATE AND ACETAMINOPHEN PRN TAB: 5; 325 TABLET ORAL at 20:59

## 2022-10-30 RX ADMIN — SENNOSIDES AND DOCUSATE SODIUM SCH TAB: 8.6; 5 TABLET ORAL at 09:05

## 2022-10-30 RX ADMIN — ATORVASTATIN CALCIUM SCH MG: 40 TABLET, FILM COATED ORAL at 19:51

## 2022-10-30 RX ADMIN — ENOXAPARIN SODIUM SCH MG: 40 INJECTION SUBCUTANEOUS at 09:01

## 2022-10-30 RX ADMIN — Medication SCH ML: at 21:00

## 2022-10-30 RX ADMIN — ESCITALOPRAM OXALATE SCH MG: 20 TABLET, FILM COATED ORAL at 09:00

## 2022-10-30 RX ADMIN — SENNOSIDES AND DOCUSATE SODIUM SCH: 8.6; 5 TABLET ORAL at 21:00

## 2022-10-30 RX ADMIN — TRAZODONE HYDROCHLORIDE PRN MG: 50 TABLET ORAL at 22:58

## 2022-10-30 RX ADMIN — AMIODARONE HYDROCHLORIDE SCH MG: 200 TABLET ORAL at 19:52

## 2022-10-30 RX ADMIN — SODIUM CHLORIDE TAB 1 GM SCH GM: 1 TAB at 09:02

## 2022-10-30 RX ADMIN — ASPIRIN SCH MG: 81 TABLET, COATED ORAL at 09:02

## 2022-10-30 RX ADMIN — ASPIRIN SCH MG: 81 TABLET, COATED ORAL at 09:00

## 2022-10-30 RX ADMIN — LORAZEPAM PRN MG: 1 TABLET ORAL at 17:08

## 2022-10-30 RX ADMIN — SODIUM CHLORIDE SCH MG: 0.9 INJECTION, SOLUTION INTRAVENOUS at 19:51

## 2022-10-30 RX ADMIN — Medication SCH ML: at 19:52

## 2022-10-30 NOTE — PN
Date of Progress Note:  10/30/2022



Subjective:  The patient was seen this morning for followup.  She was lying in bed, awake, alert, hap
py, smiling.  Denies any complaints and this is the best that I have seen her lately.  Her caregiver 
was with her at bedside.  As per my discussion with the nursing staff yesterday with assistance of mari kemp, she was able to get in and out of bed to the wheelchair and I have requested the caregiver t
o do the same thing today as she has different caregiver on different days.  Overall, she is doing mu
ch better.  Yesterday with Dulcolax rectal suppository, she had very small amount of bowel movement. 
 She had some nausea, vomiting, so limited right upper quadrant abdominal ultrasound was done, which 
came back negative.  This morning, she is feeling better.  She denies any abdominal pain.



Objective:  Vital Signs:  Reviewed. 

HEENT:  Unremarkable. 

Lungs:  Clear to auscultation. 

Heart:  Sounds normal. 

Abdomen:  Soft.  Bowel sounds normal.  No guarding, rigidity, tenderness, distention. 

Extremities:  No leg edema.



Impression:  

1.Fracture, right superior and inferior pubic rami.

2.Fracture of the right sacral ala.

3.Constipation.

4.Anxiety.

5.Depression.

6.Hypertension.



Plan:  Yesterday, the patient's blood pressure was elevated and she required a couple of doses of aml
odipine 5 mg each time and her blood pressure is much better today.  We will give another dose of Dul
colax rectal suppository and then later on milk of magnesia.  We will continue other current medical 
management and plan is to discharge her 

to go home tomorrow with caregiver services and home health care and home physical therapy.





GABY/MODL

DD:  10/30/2022 10:45:48Voice ID:  [id number]

DT:  10/30/2022 11:39:36Report ID:  [id number]

## 2022-10-31 VITALS — DIASTOLIC BLOOD PRESSURE: 65 MMHG | SYSTOLIC BLOOD PRESSURE: 115 MMHG | TEMPERATURE: 98.9 F

## 2022-10-31 RX ADMIN — LORAZEPAM PRN MG: 1 TABLET ORAL at 01:12

## 2022-10-31 RX ADMIN — ESCITALOPRAM OXALATE SCH MG: 20 TABLET, FILM COATED ORAL at 09:39

## 2022-10-31 RX ADMIN — Medication SCH ML: at 09:00

## 2022-10-31 RX ADMIN — AMIODARONE HYDROCHLORIDE SCH MG: 200 TABLET ORAL at 09:40

## 2022-10-31 RX ADMIN — ENOXAPARIN SODIUM SCH MG: 40 INJECTION SUBCUTANEOUS at 09:41

## 2022-10-31 RX ADMIN — SODIUM CHLORIDE TAB 1 GM SCH GM: 1 TAB at 09:39

## 2022-10-31 RX ADMIN — SENNOSIDES AND DOCUSATE SODIUM SCH TAB: 8.6; 5 TABLET ORAL at 09:40

## 2022-10-31 RX ADMIN — METOPROLOL TARTRATE SCH MG: 25 TABLET ORAL at 09:40

## 2022-10-31 RX ADMIN — ASPIRIN SCH MG: 81 TABLET, COATED ORAL at 09:40

## 2022-10-31 RX ADMIN — Medication SCH ML: at 09:41

## 2022-11-01 NOTE — DS
Date of Discharge:  10/31/2022



Disposition:  Discharged to go home.



Physical Examination:

HEENT:  Unremarkable. 

Lungs:  Clear to auscultation. 

Heart:  Sounds normal. 

Abdomen:  Soft.  Bowel sounds normal.  No guarding, rigidity, tenderness, or distention. 

Extremities:  No leg edema.



Discharge Instructions/medications:  Amiodarone 200 mg 2 times a day, metoprolol 25 mg 2 times a day,
 aspirin 81 mg daily, escitalopram 10 mg daily, haloperidol 2 mg 2 times a day, Norco 5 mg 3 times a 
day as needed for pain, Tylenol 500 mg 4 times a day as needed for pain, trazodone 50 mg take half a 
tablet at bedtime as needed for sleep, pantoprazole 40 mg daily, and Zyprexa 5 mg daily at bedtime. 



Follow up at my office next week.  



Follow up with Dr. Hernandez, psychiatrist and Dr. Fischer, cardiologist in 2 weeks. 



Do not take any medications if it is not listed here in this list.



Laboratory Data:  Upon admission:  White count 10.7, hemoglobin 15.5, platelets 234.  Last CBC from 1
0/29/2022:  White count 10.4, hemoglobin 10.3, platelets 278.  Initial chemistry:  Sodium 135, potass
ium 4.2, chloride 102, bicarb 28, BUN 18, creatinine 0.89, glucose 106.  Last chemistry on 10/29/2022
:  Sodium 137, potassium 4.4, chloride 103, bicarb 30, BUN 15, creatinine 0.76, glucose 124.  Liver f
unction tests unremarkable.



Hospital Course:  This is an __________-year-old very pleasant female patient who fell at home and wa
s brought into the emergency room.  Please see dictated H and P for more information.  The patient wa
s evaluated in the emergency room and was admitted to the hospital with fracture of right superior an
d inferior pubic rami as well as fracture of the right sacral ala.  There was question about fracture
 of the right 5th metatarsal bone, which was later on ruled out.  Orthopedic consultation was request
ed from Dr. Pinzon, who evaluated the patient and allowed Physical Therapy to work with the patient wit
h weightbearing as tolerated.  Initially patient had significant problem with anxiety and depression 
and the medications that she took for anxiety and depression, which were Pristiq and bupropion, were 
not helping her and psychiatric consultation was obtained from Dr. Hernandez and he discontinued these 
2 medications and started her on escitalopram and Zyprexa and subsequently, he added Haldol.  With th
is triple therapy, we have seen the best possible result controlling her anxiety and depression so we
ll that the patient's son has seen this significant improvement and I have seen it as well.  Physical
 Therapy was consulted.  The patient has not done as well as expected with physical therapy.  Our mehreen
ginal plan was to try to get her to inpatient rehab, but in order for her to go to inpatient rehab, s
he will have to do 3 hours of physical therapy on a day-to-day basis and she is not able to do so.  SANDY clark also talked about options of going to skilled nursing facility and she really will not do well as t
he patient's son has informed me and I tend to agree and patient wants to go home.  She has 24-hour c
are provided at home and here at the hospital, I have asked care provider to start transferring ruba davidson from bed to wheelchair on their own as they will be required to do so at home and they have done s
o very well and the patient was discharged now in stable medical condition to go home.  I had multipl
e discussions with the patient's 2 sons during this hospitalization.  The patient had some constipati
on problem during this hospitalization that got resolved also.  She had volume depletion, which was r
esolved with IV fluid hydration.  She had some anemia, but never required any blood transfusion.  Rep
eat CAT scan of the abdomen and pelvis showed no change in small pelvic hematoma around the fracture 
site.  No evidence of retroperitoneal hematoma.



Final Diagnoses:  

1.Fracture, right superior and inferior pubic rami.

2.Fracture, right sacral ala.

3.Anemia.

4.Volume depletion.

5.Hyponatremia.

6.Probable coronary artery disease.

7.Anxiety.

8.Depression.

9.Hypertension.

10.Hyperlipidemia.

11.Aortic valve regurgitation.

12.Diverticulosis.





GABY/MODL

DD:  10/31/2022 20:51:31Voice ID:  [id number]

DT:  11/01/2022 03:24:04Report ID:  [id number]

## 2022-11-11 ENCOUNTER — HOSPITAL ENCOUNTER (EMERGENCY)
Dept: HOSPITAL 97 - ER | Age: 82
Discharge: ANOTHER HOSPITAL | End: 2022-11-11
Payer: COMMERCIAL

## 2022-11-11 ENCOUNTER — HOSPITAL ENCOUNTER (EMERGENCY)
Dept: HOSPITAL 97 - ER | Age: 82
Discharge: LEFT BEFORE DISCHARGE | End: 2022-11-11
Payer: COMMERCIAL

## 2022-11-11 VITALS — TEMPERATURE: 97.2 F | OXYGEN SATURATION: 97 % | SYSTOLIC BLOOD PRESSURE: 136 MMHG | DIASTOLIC BLOOD PRESSURE: 52 MMHG

## 2022-11-11 VITALS — TEMPERATURE: 98.6 F

## 2022-11-11 DIAGNOSIS — S32.19XA: Primary | ICD-10-CM

## 2022-11-11 DIAGNOSIS — S32.810A: ICD-10-CM

## 2022-11-11 DIAGNOSIS — I48.91: ICD-10-CM

## 2022-11-11 DIAGNOSIS — Z79.82: ICD-10-CM

## 2022-11-11 DIAGNOSIS — I10: ICD-10-CM

## 2022-11-11 DIAGNOSIS — Z02.9: Primary | ICD-10-CM

## 2022-11-11 LAB
HCT VFR BLD CALC: 34.5 % (ref 36–45)
INR BLD: 1.19
LYMPHOCYTES # SPEC AUTO: 0.8 K/UL (ref 0.7–4.9)
MCV RBC: 94.8 FL (ref 80–100)
PMV BLD: 7.3 FL (ref 7.6–11.3)
RBC # BLD: 3.64 M/UL (ref 3.86–4.86)

## 2022-11-11 PROCEDURE — 85025 COMPLETE CBC W/AUTO DIFF WBC: CPT

## 2022-11-11 PROCEDURE — 99281 EMR DPT VST MAYX REQ PHY/QHP: CPT

## 2022-11-11 PROCEDURE — 96375 TX/PRO/DX INJ NEW DRUG ADDON: CPT

## 2022-11-11 PROCEDURE — 85610 PROTHROMBIN TIME: CPT

## 2022-11-11 PROCEDURE — 93005 ELECTROCARDIOGRAM TRACING: CPT

## 2022-11-11 PROCEDURE — 72170 X-RAY EXAM OF PELVIS: CPT

## 2022-11-11 PROCEDURE — 72192 CT PELVIS W/O DYE: CPT

## 2022-11-11 PROCEDURE — 96361 HYDRATE IV INFUSION ADD-ON: CPT

## 2022-11-11 PROCEDURE — 36415 COLL VENOUS BLD VENIPUNCTURE: CPT

## 2022-11-11 PROCEDURE — 96374 THER/PROPH/DIAG INJ IV PUSH: CPT

## 2022-11-11 PROCEDURE — 71045 X-RAY EXAM CHEST 1 VIEW: CPT

## 2022-11-11 PROCEDURE — 99285 EMERGENCY DEPT VISIT HI MDM: CPT

## 2022-11-11 NOTE — ER
Nurse's Notes                                                                                     

 [facility]                                                                 

Name: Nellie Malcolm                                                                                

Age: 82 yrs                                                                                       

Sex: Female                                                                                       

: 1940                                                                                   

MRN: [id number]                                                                                   

Arrival Date: 2022                                                                          

Time: 19:58                                                                                       

Account#: [id number]                                                                            

Bed 26                                                                                            

Private MD:                                                                                       

Diagnosis: Fracture of other parts of pelvis-DISPLACED RIGHT OBTURATOR RING, BILATERAL SACRAL     

  FRACTURES, RIGHT ILIAC BONE, INCREASED DISPLACEMENT OF TGE SEGMENTAL RIGHT PUBIC                

  RAMUS, NEW LEFT SACRAL ALA FRACTURE, SUB ACUTE PELVUIC HEMATOMA                                 

                                                                                                  

Presentation:                                                                                     

                                                                                             

19:59 Chief complaint: EMS states: "She fell on the  and she went to the       [facility] 

      hospital and discharged the . We picked her up earlier today and took        

      her to [facility]. The complaint is the same as earlier today pain in the right leg.     

      Pain is 10/10. No SOB, vital signs stable.". Coronavirus screen: Vaccine status:            

      Patient reports being unvaccinated. Ebola Screen: Patient negative for fever greater        

      than or equal to 101.5 degrees Fahrenheit, and additional compatible Ebola Virus            

      Disease symptoms Patient denies exposure to infectious person. Patient denies travel to     

      an Ebola-affected area in the 21 days before illness onset. Initial Sepsis Screen: Does     

      the patient meet any 2 criteria? No. Patient's initial sepsis screen is negative. Does      

      the patient have a suspected source of infection? No. Patient's initial sepsis screen       

      is negative. Risk Assessment: Do you want to hurt yourself or someone else? Patient         

      reports no desire to harm self or others. Onset of symptoms is unknown.                     

19:59 Method Of Arrival: EMS: [facility]                                                tw5 

19:59 Acuity: STEVIE 4                                                                           tw5 

                                                                                                  

Triage Assessment:                                                                                

20:01 General: Appears in no apparent distress. uncomfortable, Behavior is calm, cooperative, tw5 

      appropriate for age. Pain: Complains of pain in right leg Pain currently is 10 out of       

      10 on a pain scale.                                                                         

                                                                                                  

Historical:                                                                                       

- Allergies:                                                                                      

20:01 No Known Allergies;                                                                     tw5 

- Home Meds:                                                                                      

20:01 Amiodarone Oral [Active]; amlodipine oral [Active]; Aspirin Oral [Active]; atorvastatin tw5 

      oral [Active]; bupropion HCl 75 mg Oral tab twice a day [Active]; desvenlafaxine 50 mg      

      Oral tr24 1 tab once daily [Active]; Haloperidol Oral [Active]; Metoprolol Tartrate         

      Oral [Active]; Norco Oral [Active]; pantoprazole oral [Active]; Trazodone Oral              

      [Active]; Zyprexa Oral [Active];                                                            

- PMHx:                                                                                           

20:01 Hypertensive disorder; Atrial fibrillation;                                             tw5 

                                                                                                  

- Immunization history:: Adult Immunizations not up to date.                                      

- Social history:: Smoking status: Patient denies any tobacco usage or history of.                

                                                                                                  

                                                                                                  

Screenin:45 Abuse screen: Denies threats or abuse. Nutritional screening: No deficits noted.        em6 

      Tuberculosis screening: No symptoms or risk factors identified. Fall Risk IV access (20     

      points). Gait- Weak (10 pts.). Total Phillips Fall Scale indicates Low Risk Score (25-44       

      pts). Fall prevention measures have been instituted. Side Rails Up X 2 Placed close to      

      Nursing Station 1:1 attendant Assigned to Pt. Frequent Obs/Assesments occuring Family       

      Present and informed to notify staff if they need to leave bedside As available Patient     

      and Family Educated on Fall Prevention Program and strategies.                              

                                                                                                  

Assessment:                                                                                       

20:05 General: Patient appears in no apparent distress in EMS stretcher. Conversing with      tw5 

      family and staff without difficulty..                                                       

21:45 General: Appears distressed, uncomfortable, Behavior is cooperative, anxious, crying.   em6 

      Pain: Complains of pain in left leg and pelvis and right leg Pain currently is 10 out       

      of 10 on a pain scale. Neuro: Level of Consciousness is awake, alert, obeys commands,       

      Oriented to person, place, time, situation. Cardiovascular: Patient's skin is warm and      

      dry. Rhythm is sinus rhythm. Respiratory: Airway is patent Respiratory effort is even,      

      unlabored, Respiratory pattern is regular, symmetrical, Breath sounds are clear             

      bilaterally. GI: No signs and/or symptoms were reported involving the gastrointestinal      

      system. : No signs and/or symptoms were reported regarding the genitourinary system.      

      EENT: No signs and/or symptoms were reported regarding the EENT system. Derm: No signs      

      and/or symptoms reported regarding the dermatologic system. Musculoskeletal: Range of       

      motion: intact in right leg.                                                                

22:45 Reassessment: No changes from previously documented assessment. Patient and/or family   em6 

      updated on plan of care and expected duration. Pain level reassessed. Patient is alert,     

      oriented x 3, equal unlabored respirations, skin warm/dry/pink.                             

23:47 Reassessment: No changes from previously documented assessment. Patient and/or family   em6 

      updated on plan of care and expected duration. Pain level reassessed. Patient is alert,     

      oriented x 3, equal unlabored respirations, skin warm/dry/pink.                             

23:51 Reassessment: gave report to [facility]. and [facility] ems.                             em6 

                                                                                                  

Vital Signs:                                                                                      

19:59  / 79; Pulse 73; Resp 14; Temp 98.6(O); Pulse Ox 97% on R/A; Weight 68.04 kg;     tw5 

      Height 5 ft. 3 in. (160.02 cm); Pain 10/10;                                                 

23:23  / 93; Pulse 65; Resp 18; Pulse Ox 94% on R/A;                                    em6 

19:59 Body Mass Index 26.57 (68.04 kg, 160.02 cm)                                             tw5 

                                                                                                  

ED Course:                                                                                        

19:58 Patient arrived in ED.                                                                  tw5 

20:01 Triage completed.                                                                       tw5 

20:01 Arm band placed on.                                                                     tw5 

20:20 Cleveland De La Torre MD is Attending Physician.                                             [facility] 

20:56 Sarah Joseph, JUNIOR is Primary Nurse.                                                   em6 

21:32 CT Pelvis wo Cont In Process Unspecified.                                               EDMS

21:44 XRAY Chest (1 view) In Process Unspecified.                                             EDMS

21:44 Pelvis XRAY In Process Unspecified.                                                     EDMS

21:45 Placed in gown. Bed in low position. Call light in reach. Side rails up X2. Cardiac     em6 

      monitor on. Pulse ox on. NIBP on. Warm blanket given.                                       

21:53 Inserted saline lock: 22 gauge in left antecubital area, using aseptic technique. Blood zm  

      collected.                                                                                  

22:46 initiated a transfer with Naida from [facility].                mw2 

23:12 administrative approval given by Naida Vasquez/ patient has been accepted to [address] to the ER/ Dr. Novoa accepted the patient in transfer/ report to be           

      called to [phone].                                                                     

23:45 Tib Fib Right XRAY In Process Unspecified.                                              EDMS

23:47 No provider procedures requiring assistance completed. Patient transferred, IV remains  em6 

      in place.                                                                                   

                                                                                                  

Administered Medications:                                                                         

22:09 Drug: NS 0.9% 500 ml Route: IV; Rate: bolus; Site: left antecubital;                    em6 

23:20 Follow up: Response: No adverse reaction; IV Status: Completed infusion; IV Intake:     em6 

      500ml                                                                                       

22:09 Drug: Dilaudid (HYDROmorphone) 0.5 mg Route: IVP; Site: left antecubital;               em6 

23:00 Follow up: Response: No adverse reaction                                                em6 

22:09 Drug: Zofran (Ondansetron) 4 mg Route: IVP; Site: left antecubital;                     em6 

23:00 Follow up: Response: No adverse reaction                                                em6 

23:46 Not Given (Physician Discretion): Dilaudid (HYDROmorphone) 0.5 mg IVP once              em6 

                                                                                                  

                                                                                                  

Medication:                                                                                       

23:51 VIS not applicable for this client.                                                     em6 

                                                                                                  

Intake:                                                                                           

23:20 IV: 500ml; Total: 500ml.                                                                em6 

                                                                                                  

Outcome:                                                                                          

22:56 ER care complete, transfer ordered by MD.                                               amish 

23:51 Transferred by ground EMS to [facility], Transfer form completed.             em6 

23:51 Condition: stable                                                                           

23:51 Instructed on the need for transfer, Demonstrated understanding of instructions.            

23:55 Patient left the ED.                                                                    em6 

                                                                                                  

Signatures:                                                                                       

Dispatcher [employer]                           EDMS                                                 

Cleveland De La Torre MD MD cha Westbrook, Theron coronel2                                                  

Gianna Argueta                                tw5                                                  

Lisa Joseph Erika, RN                     RN   em6                                                  

                                                                                                  

Corrections: (The following items were deleted from the chart)                                    

20:05 20:01 Home Meds: Bupropion Oral; tw5                                                     

20:05 20:01 PMHx: Dementia; tw 

                                                                                                  

**************************************************************************************************

## 2022-11-11 NOTE — RAD REPORT
EXAM DESCRIPTION:  RAD - Pelvis - 11/11/2022 9:42 pm

 

CLINICAL HISTORY:  TRAUMA

 

COMPARISON:  Same-day CT

 

FINDINGS/IMPRESSION:  Displaced right obturator ring fracture again identified. Bilateral sacral frac
tures and right iliac bone fracture noted. Reference subsequent CT. The femoral heads are located.

## 2022-11-11 NOTE — RAD REPORT
EXAM DESCRIPTION:  CT - Pelvis Wo Cont - 11/11/2022 9:30 pm

 

CLINICAL HISTORY:  Pelvic trauma

 

COMPARISON:  Pelvis Wo Cont dated 10/19/2022; Chest Single View dated 11/11/2022; Pelvis dated 11/11/
2022

 

FINDINGS:  Comminuted fracture involving the right obturator ring. The inferior pubic ramus has a seg
mental fracture which is displaced by a more than a full shaft width anteriorly. The displacement has
 increased. These fractures are subacute. Nondisplaced right iliac wing fracture which is subacute. S
ubacute pelvic hematoma has decreased. Moderate rectal stool. Bilateral sacral fractures are present.
 The left sacral fracture was not as evident on the prior CT.

 

IMPRESSION:  Increased displacement of the segmental right inferior pubic ramus fracture. New versus 
more conspicuous left sacral alar fracture which is nondisplaced. Other previously noted fractures in
cluding a right sacral fracture and right iliac wing fractures have similar alignment. Subacute pelvi
c hematoma has decreased in volume. .

## 2022-11-11 NOTE — EDPHYS
Physician Documentation                                                                           

 [facility]                                                                 

Name: Nellie Malcolm                                                                                

Age: 82 yrs                                                                                       

Sex: Female                                                                                       

: 1940                                                                                   

MRN: [id number]                                                                                   

Arrival Date: 2022                                                                          

Time: 19:58                                                                                       

Account#: [id number]                                                                            

Bed 26                                                                                            

Private MD:                                                                                       

ED Physician Cleveland De La Torre                                                                      

HPI:                                                                                              

                                                                                             

22:41 This 82 yrs old  Female presents to ER via EMS with complaints of Fall Injury. amish 

                                                                                                  

Historical:                                                                                       

- Allergies:                                                                                      

20:01 No Known Allergies;                                                                     tw5 

- Home Meds:                                                                                      

20:01 Amiodarone Oral [Active]; amlodipine oral [Active]; Aspirin Oral [Active]; atorvastatin tw5 

      oral [Active]; bupropion HCl 75 mg Oral tab twice a day [Active]; desvenlafaxine 50 mg      

      Oral tr24 1 tab once daily [Active]; Haloperidol Oral [Active]; Metoprolol Tartrate         

      Oral [Active]; Norco Oral [Active]; pantoprazole oral [Active]; Trazodone Oral              

      [Active]; Zyprexa Oral [Active];                                                            

- PMHx:                                                                                           

20:01 Hypertensive disorder; Atrial fibrillation;                                             tw5 

                                                                                                  

- Immunization history:: Adult Immunizations not up to date.                                      

- Social history:: Smoking status: Patient denies any tobacco usage or history of.                

                                                                                                  

                                                                                                  

ROS:                                                                                              

22:46 Constitutional: Negative for fever, chills, and weight loss, Eyes: Negative for injury, amish 

      pain, redness, and discharge, ENT: Negative for injury, pain, and discharge, Neck:          

      Negative for injury, pain, and swelling, Cardiovascular: Negative for chest pain,           

      palpitations, and edema, Respiratory: Negative for shortness of breath, cough,              

      wheezing, and pleuritic chest pain, Abdomen/GI: Negative for abdominal pain, nausea,        

      vomiting, diarrhea, and constipation, Back: Negative for injury and pain, : Negative      

      for injury, bleeding, discharge, and swelling, Skin: Negative for injury, rash, and         

      discoloration, Neuro: Negative for headache, weakness, numbness, tingling, and seizure,     

      Psych: Negative for depression, anxiety, suicide ideation, homicidal ideation, and          

      hallucinations, Allergy/Immunology: Negative for hives, rash, and allergies, Endocrine:     

      Negative for neck swelling, polydipsia, polyuria, polyphagia, and marked weight             

      changes, Hematologic/Lymphatic: Negative for swollen nodes, abnormal bleeding, and          

      unusual bruising.                                                                           

22:46 MS/extremity: Positive for decreased range of motion, pain, tenderness, of the pelvis,      

      RIGHT TIB FIB SWELLING.                                                                     

                                                                                                  

Exam:                                                                                             

22:47 Constitutional:  This is a well developed, well nourished patient who is awake, alert,  amish 

      and in no acute distress. Head/Face:  Normocephalic, atraumatic. Eyes:  Pupils equal        

      round and reactive to light, extra-ocular motions intact.  Lids and lashes normal.          

      Conjunctiva and sclera are non-icteric and not injected.  Cornea within normal limits.      

      Periorbital areas with no swelling, redness, or edema. ENT:  Nares patent. No nasal         

      discharge, no septal abnormalities noted.  Tympanic membranes are normal and external       

      auditory canals are clear.  Oropharynx with no redness, swelling, or masses, exudates,      

      or evidence of obstruction, uvula midline.  Mucous membranes moist. Neck:  Trachea          

      midline, no thyromegaly or masses palpated, and no cervical lymphadenopathy.  Supple,       

      full range of motion without nuchal rigidity, or vertebral point tenderness.  No            

      Meningismus. Chest/axilla:  Normal chest wall appearance and motion.  Nontender with no     

      deformity.  No lesions are appreciated. Cardiovascular:  Regular rate and rhythm with a     

      normal S1 and S2.  No gallops, murmurs, or rubs.  Normal PMI, no JVD.  No pulse             

      deficits. Respiratory:  Lungs have equal breath sounds bilaterally, clear to                

      auscultation and percussion.  No rales, rhonchi or wheezes noted.  No increased work of     

      breathing, no retractions or nasal flaring. Abdomen/GI:  Soft, non-tender, with normal      

      bowel sounds.  No distension or tympany.  No guarding or rebound.  No evidence of           

      tenderness throughout. Back:  No spinal tenderness.  No costovertebral tenderness.          

      Full range of motion. Female :  Normal external genitalia. Skin:  Warm, dry with          

      normal turgor.  Normal color with no rashes, no lesions, and no evidence of cellulitis.     

      Neuro:  Awake and alert, GCS 15, oriented to person, place, time, and situation.            

      Cranial nerves II-XII grossly intact.  Motor strength 5/5 in all extremities.  Sensory      

      grossly intact.  Cerebellar exam normal.  Normal gait. Psych:  Awake, alert, with           

      orientation to person, place and time.  Behavior, mood, and affect are within normal        

      limits.                                                                                     

22:47 Musculoskeletal/extremity: Extremities: decreased ROM, pain, ROM: intact in all             

      extremities, limited active range of motion due to pain, limited passive range of           

      motion due to pain, in the right leg and left leg, Circulation is intact in all             

      extremities. Sensation intact. Compartment Syndrome exam of affected extremity: is          

      normal.                                                                                     

22:57 ECG was reviewed by the Attending Physician.                                            amish 

                                                                                                  

Vital Signs:                                                                                      

19:59  / 79; Pulse 73; Resp 14; Temp 98.6(O); Pulse Ox 97% on R/A; Weight 68.04 kg;     tw5 

      Height 5 ft. 3 in. (160.02 cm); Pain 10/10;                                                 

23:23  / 93; Pulse 65; Resp 18; Pulse Ox 94% on R/A;                                    em6 

19:59 Body Mass Index 26.57 (68.04 kg, 160.02 cm)                                             tw5 

                                                                                                  

MDM:                                                                                              

20:20 Patient medically screened.                                                             amish 

22:51 Differential diagnosis: contusion, fracture, multiple trauma, sprain, strain. Data      amish 

      reviewed: vital signs, nurses notes, EMS record, lab test result(s), EKG, radiologic        

      studies, CT scan, plain films. Data interpreted: Cardiac monitor: rate is 73 beats/min,     

      rhythm is regular, Pulse oximetry: on room air is 97 %. Test interpretation: by ED          

      physician or midlevel provider: ECG, plain radiologic studies. Counseling: I had a          

      detailed discussion with the patient and/or guardian regarding: the historical points,      

      exam findings, and any diagnostic results supporting the discharge/admit diagnosis, lab     

      results, radiology results, the need to transfer to another facility, for higher level      

      of care, [facility] does not immediately have the required specialist.    

                                                                                                  

                                                                                             

20:20 Order name: CBC with Diff; Complete Time: 22:25                                         [facility] 

                                                                                             

20:20 Order name: PT-INR; Complete Time: 22:25                                                [facility] 

                                                                                             

20:20 Order name: XRAY Chest (1 view); Complete Time: 22:25                                   [facility] 

                                                                                             

20:20 Order name: Pelvis XRAY; Complete Time: 22:25                                           [facility] 

                                                                                             

20:20 Order name: CT Pelvis wo Cont; Complete Time: 22:25                                     [facility] 

                                                                                             

22:46 Order name: Tib Fib Right XRAY                                                          [facility] 

                                                                                             

20:20 Order name: EKG; Complete Time: 20:21                                                   [facility] 

                                                                                             

20:20 Order name: Cardiac monitoring; Complete Time: :                                    [facility] 

                                                                                             

20:20 Order name: EKG - Nurse/Tech; Complete Time: :                                      [facility] 

                                                                                             

20:20 Order name: IV Saline Lock; Complete Time: :53                                        [facility] 

                                                                                             

20:20 Order name: Labs collected and sent; Complete Time: :                               [facility] 

                                                                                             

20:20 Order name: O2 Per Protocol; Complete Time: :                                       [facility] 

                                                                                             

20:20 Order name: O2 Sat Monitoring; Complete Time: :                                     [facility] 

                                                                                                  

EC:57 Rate is 62 beats/min. Rhythm is regular. QRS Axis is Normal. DE interval is normal. QRS amish 

      interval is normal. QT interval is normal. No Q waves. T waves are Normal. No ST            

      changes noted. Clinical impression: NSR w/ Non-specific ST/T Changes, 1st degree heart      

      block, and No evidence of ischemia. Interpreted by me. Reviewed by me.                      

                                                                                                  

Administered Medications:                                                                         

22:09 Drug: NS 0.9% 500 ml Route: IV; Rate: bolus; Site: left antecubital;                    em6 

23:20 Follow up: Response: No adverse reaction; IV Status: Completed infusion; IV Intake:     em6 

      500ml                                                                                       

22:09 Drug: Dilaudid (HYDROmorphone) 0.5 mg Route: IVP; Site: left antecubital;               em6 

23:00 Follow up: Response: No adverse reaction                                                em6 

22:09 Drug: Zofran (Ondansetron) 4 mg Route: IVP; Site: left antecubital;                     em6 

23:00 Follow up: Response: No adverse reaction                                                em6 

23:46 Not Given (Physician Discretion): Dilaudid (HYDROmorphone) 0.5 mg IVP once              em6 

                                                                                                  

                                                                                                  

Disposition Summary:                                                                              

22 22:56                                                                                    

Transfer Ordered                                                                                  

      Transfer Location: [facility] 

      Reason: Higher level of care                                                            amish 

      Condition: Fair                                                                         amish 

      Problem: new                                                                            amish 

      Symptoms: have improved                                                                 amish 

      Accepting Physician: TO [facility](22 23:55)                                          em6 

      Diagnosis                                                                                   

        - Fracture of other parts of pelvis - DISPLACED RIGHT OBTURATOR RING, BILATERAL       amish 

      SACRAL FRACTURES, RIGHT ILIAC BONE, INCREASED DISPLACEMENT OF TGE SEGMENTAL RIGHT PUBIC     

      RAMUS, NEW LEFT SACRAL ALA FRACTURE, SUB ACUTE PELVUIC HEMATOMA                             

      Forms:                                                                                      

        - Medication Reconciliation Form                                                      amish 

        - SBAR form                                                                           amish 

Signatures:                                                                                       

Dispatcher [employer]                           Cleveland Eisenberg MD MD cha Wood, Tiffany                                tw5                                                  

Sarah Joseph RN                     RN   em6                                                  

                                                                                                  

Corrections: (The following items were deleted from the chart)                                    

20:05 20:01 Home Meds: Bupropion Oral;  

20:05 20:01 PMHx: Dementia;  

23:55 22:56 TO [facility] amish                                                                     em6 

                                                                                                  

**************************************************************************************************

## 2022-11-11 NOTE — RAD REPORT
EXAM DESCRIPTION:  RAD - Chest Single View - 11/11/2022 9:42 pm

 

CLINICAL HISTORY:  COUGH

 

COMPARISON:  Pelvis Wo Cont dated 11/11/2022Chest Pa And Lat (2 Views) dated 10/13/2022; CHEST PA AND
 LAT 2 VIEW dated 12/21/2014

 

FINDINGS:  Lines: None.

Lungs: Mild nonspecific coarsening of the interstitium which is likely chronic. No edema or consolida
tion.

Pleural: No significant pleural effusions or pneumothorax.

Cardiac: Cardiomegaly.

Mediastinum: Within normal limits.

Bones: No acute fractures.

Other: None

 

IMPRESSION:  No acute cardiopulmonary disease. Cardiomegaly.

## 2022-11-11 NOTE — ER
Nurse's Notes                                                                                     

 [facility]                                                                 

Name: Nellie Malcolm                                                                                

Age: 82 yrs                                                                                       

Sex: Female                                                                                       

: 1940                                                                                   

MRN: [id number]                                                                                   

Arrival Date: 2022                                                                          

Time: 13:05                                                                                       

Account#: [id number]                                                                            

Bed IW1                                                                                           

Private MD: Rubén Archuleta                                                                          

Diagnosis:                                                                                        

                                                                                                  

Presentation:                                                                                     

                                                                                             

13:23 Chief complaint: Patient states: "I broke my hip 3 weeks ago, but they pain is getting  ss  

      worse." Today c/o left hip pain 9/10. Coronavirus screen: At this time, the client does     

      not indicate any symptoms associated with coronavirus-19. Ebola Screen: No symptoms or      

      risks identified at this time. Risk Assessment: Do you want to hurt yourself or someone     

      else? Patient reports no desire to harm self or others. Onset of symptoms was 2022.                                                                                   

13:23 Method Of Arrival: Wheelchair                                                           ss  

13:23 Acuity: STEVIE 3                                                                           ss  

                                                                                                  

Historical:                                                                                       

- Allergies:                                                                                      

13:25 No Known Drug Allergies;                                                                ss  

                                                                                                  

                                                                                                  

                                                                                                  

Vital Signs:                                                                                      

13:23  / 52; Pulse 57; Resp 16; Temp 97.2; Pulse Ox 97% on R/A; Weight 68.04 kg; Height ss  

      5 ft. 3 in. (160.02 cm); Pain 9/10;                                                         

13:23 Body Mass Index 26.57 (68.04 kg, 160.02 cm)                                             ss  

                                                                                                  

ED Course:                                                                                        

13:05 Patient arrived in ED.                                                                  as  

13:16 Rubén Archuleta MD is Private Physician.                                                  as  

13:25 Triage completed.                                                                       ss  

13:25 Arm band placed on.                                                                     ss  

13:29 [facility]                                                                  jl9 

13:29 Collins Clemons MD is Attending Physician.                                              jl9 

14:22 Patient's name was called from ER lobby. No response.                                   iw  

14:36 Patient's name was called from ER lobby. No response. Unable to locate patient. Will    hb  

      disposition as left without being seen by a provider.                                       

15:07 Collins Clemons MD is Attending Physician.                                              kdr 

                                                                                                  

Administered Medications:                                                                         

No medications were administered                                                                  

                                                                                                  

                                                                                                  

Outcome:                                                                                          

15:10 Patient left the ED.                                                                    hb  

                                                                                                  

Signatures:                                                                                       

Collins Clemons MD MD   kdr                                                  

Geovanna Joseph                             as                                                   

Radha Alarcon RN                     RN                                                      

Marlena Harrell RN RN                                                      

Britany Calderon RN                     RN   Les Gabriel                                jl9                                                  

                                                                                                  

Corrections: (The following items were deleted from the chart)                                    

 13:22 Chief complaint: ss                                                               ss  

                                                                                                  

**************************************************************************************************

## 2022-11-12 VITALS — DIASTOLIC BLOOD PRESSURE: 93 MMHG | OXYGEN SATURATION: 94 % | SYSTOLIC BLOOD PRESSURE: 210 MMHG

## 2022-11-13 ENCOUNTER — HOSPITAL ENCOUNTER (INPATIENT)
Dept: HOSPITAL 97 - ER | Age: 82
LOS: 3 days | Discharge: NURSING, REHAB OR LONG-TERM CARE | DRG: 560 | End: 2022-11-16
Attending: INTERNAL MEDICINE | Admitting: INTERNAL MEDICINE
Payer: COMMERCIAL

## 2022-11-13 VITALS — BODY MASS INDEX: 26.4 KG/M2

## 2022-11-13 DIAGNOSIS — Z79.82: ICD-10-CM

## 2022-11-13 DIAGNOSIS — S32.119D: ICD-10-CM

## 2022-11-13 DIAGNOSIS — E87.6: ICD-10-CM

## 2022-11-13 DIAGNOSIS — I10: ICD-10-CM

## 2022-11-13 DIAGNOSIS — K57.90: ICD-10-CM

## 2022-11-13 DIAGNOSIS — J30.9: ICD-10-CM

## 2022-11-13 DIAGNOSIS — Z88.3: ICD-10-CM

## 2022-11-13 DIAGNOSIS — S30.0XXD: ICD-10-CM

## 2022-11-13 DIAGNOSIS — F32.A: ICD-10-CM

## 2022-11-13 DIAGNOSIS — S32.591D: ICD-10-CM

## 2022-11-13 DIAGNOSIS — E03.9: ICD-10-CM

## 2022-11-13 DIAGNOSIS — I25.10: ICD-10-CM

## 2022-11-13 DIAGNOSIS — Z79.899: ICD-10-CM

## 2022-11-13 DIAGNOSIS — S32.019D: ICD-10-CM

## 2022-11-13 DIAGNOSIS — Z88.8: ICD-10-CM

## 2022-11-13 DIAGNOSIS — E78.5: ICD-10-CM

## 2022-11-13 DIAGNOSIS — E87.1: ICD-10-CM

## 2022-11-13 DIAGNOSIS — S32.511D: Primary | ICD-10-CM

## 2022-11-13 DIAGNOSIS — M81.0: ICD-10-CM

## 2022-11-13 DIAGNOSIS — F41.9: ICD-10-CM

## 2022-11-13 LAB
ALBUMIN SERPL BCP-MCNC: 3 G/DL (ref 3.4–5)
ALP SERPL-CCNC: 167 U/L (ref 45–117)
ALT SERPL W P-5'-P-CCNC: 21 U/L (ref 12–78)
AST SERPL W P-5'-P-CCNC: 18 U/L (ref 15–37)
BUN BLD-MCNC: 18 MG/DL (ref 7–18)
GLUCOSE SERPLBLD-MCNC: 114 MG/DL (ref 74–106)
HCT VFR BLD CALC: 34.9 % (ref 36–45)
LYMPHOCYTES # SPEC AUTO: 0.4 K/UL (ref 0.7–4.9)
MCV RBC: 94.7 FL (ref 80–100)
PMV BLD: 7.4 FL (ref 7.6–11.3)
POTASSIUM SERPL-SCNC: 4 MMOL/L (ref 3.5–5.1)
RBC # BLD: 3.68 M/UL (ref 3.86–4.86)

## 2022-11-13 PROCEDURE — 97530 THERAPEUTIC ACTIVITIES: CPT

## 2022-11-13 PROCEDURE — 83735 ASSAY OF MAGNESIUM: CPT

## 2022-11-13 PROCEDURE — 97116 GAIT TRAINING THERAPY: CPT

## 2022-11-13 PROCEDURE — 96361 HYDRATE IV INFUSION ADD-ON: CPT

## 2022-11-13 PROCEDURE — 71045 X-RAY EXAM CHEST 1 VIEW: CPT

## 2022-11-13 PROCEDURE — 87811 SARS-COV-2 COVID19 W/OPTIC: CPT

## 2022-11-13 PROCEDURE — 93970 EXTREMITY STUDY: CPT

## 2022-11-13 PROCEDURE — 85610 PROTHROMBIN TIME: CPT

## 2022-11-13 PROCEDURE — 96372 THER/PROPH/DIAG INJ SC/IM: CPT

## 2022-11-13 PROCEDURE — 93005 ELECTROCARDIOGRAM TRACING: CPT

## 2022-11-13 PROCEDURE — 99284 EMERGENCY DEPT VISIT MOD MDM: CPT

## 2022-11-13 PROCEDURE — 96374 THER/PROPH/DIAG INJ IV PUSH: CPT

## 2022-11-13 PROCEDURE — 80053 COMPREHEN METABOLIC PANEL: CPT

## 2022-11-13 PROCEDURE — 80048 BASIC METABOLIC PNL TOTAL CA: CPT

## 2022-11-13 PROCEDURE — 99285 EMERGENCY DEPT VISIT HI MDM: CPT

## 2022-11-13 PROCEDURE — 97161 PT EVAL LOW COMPLEX 20 MIN: CPT

## 2022-11-13 PROCEDURE — 85025 COMPLETE CBC W/AUTO DIFF WBC: CPT

## 2022-11-13 PROCEDURE — 36415 COLL VENOUS BLD VENIPUNCTURE: CPT

## 2022-11-13 PROCEDURE — 72192 CT PELVIS W/O DYE: CPT

## 2022-11-13 PROCEDURE — 99281 EMR DPT VST MAYX REQ PHY/QHP: CPT

## 2022-11-13 PROCEDURE — 72131 CT LUMBAR SPINE W/O DYE: CPT

## 2022-11-13 PROCEDURE — 72170 X-RAY EXAM OF PELVIS: CPT

## 2022-11-13 PROCEDURE — 96375 TX/PRO/DX INJ NEW DRUG ADDON: CPT

## 2022-11-13 RX ADMIN — FAMOTIDINE SCH MG: 10 INJECTION, SOLUTION INTRAVENOUS at 23:03

## 2022-11-13 RX ADMIN — HYDROCODONE BITARTRATE AND ACETAMINOPHEN SCH TAB: 7.5; 325 TABLET ORAL at 23:03

## 2022-11-13 RX ADMIN — SODIUM CHLORIDE SCH MLS: 0.9 INJECTION, SOLUTION INTRAVENOUS at 23:00

## 2022-11-13 RX ADMIN — SODIUM CHLORIDE TAB 1 GM SCH GM: 1 TAB at 23:30

## 2022-11-13 RX ADMIN — METOPROLOL TARTRATE SCH MG: 50 TABLET, FILM COATED ORAL at 23:01

## 2022-11-13 RX ADMIN — AMIODARONE HYDROCHLORIDE SCH MG: 200 TABLET ORAL at 23:01

## 2022-11-13 RX ADMIN — Medication SCH ML: at 21:00

## 2022-11-13 RX ADMIN — ATORVASTATIN CALCIUM SCH MG: 20 TABLET, FILM COATED ORAL at 23:01

## 2022-11-13 RX ADMIN — SENNOSIDES AND DOCUSATE SODIUM SCH TAB: 8.6; 5 TABLET ORAL at 23:30

## 2022-11-13 NOTE — RAD REPORT
EXAM DESCRIPTION:  US - Extrem Venous W Compress Guzman - 11/13/2022 7:46 pm

 

CLINICAL HISTORY:  PAIN

 

COMPARISON:  none

 

TECHNIQUE:  Real-time sonographic evaluation of the lower extremity deep venous systems was performed
 using color Doppler, grayscale, and compression.

 

FINDINGS:  Bilateral lower extremities.

 

Normal compressibility, flow augmentation, phasic flow and spontaneous flow is identified in both the
 left and right lower extremity deep venous systems. No intraluminal filling defects seen.

 

 

IMPRESSION:  No DVT in either lower extremity.

## 2022-11-13 NOTE — RAD REPORT
EXAM DESCRIPTION:  CT - Spine Lumbar Wo Con - 11/13/2022 5:35 pm

 

CLINICAL HISTORY:

Back pain

 

COMPARISON:  Pelvis Wo Cont dated 11/11/2022; Abdomen   Pelvis Wo Contrast dated 10/26/2022; Pelvis W
o Cont dated 10/19/2022

 

TECHNIQUE:  Axial noncontrast CT imaging of the lumbar spine was performed with coronal and sagittal 
re-formatted images.

 

All CT scans are performed using dose optimization technique as appropriate and may include automated
 exposure control or mA/KV adjustment according to patient size.

 

FINDINGS:  No vertebral body compression fractures identified. Nondisplaced left L5 transverse proces
s fracture. This is not well appreciated on the prior CTs. It is doubtful to be a new fracture. Bilat
eral sacral fractures and right iliac wing fracture again identified. Presacral edema. Abdominal aort
ic atherosclerosis with ectasia. Probable small left effusion.

 

IMPRESSION:  Nondisplaced left L5 transverse process fracture which was not clearly present on the pr
ior CTs. In the absence of interval trauma, the fracture was probably previously radiographically occ
ult.

## 2022-11-13 NOTE — RAD REPORT
EXAM DESCRIPTION:  CT - Pelvis Wo Cont - 11/13/2022 5:36 pm

 

CLINICAL HISTORY:  Back pain

 

COMPARISON:  Pelvis Wo Cont dated 11/11/2022; Pelvis Wo Cont dated 10/19/2022

 

FINDINGS:  Bilateral sacral fractures, displaced and comminuted subacute obturator ring fracture is u
nchanged. Moderate stool in the rectum. Residual hematoma noted. Mild circumferential bladder wall th
ickening which is nonspecific. Right iliac wing fracture.

 

IMPRESSION:  Similar alignment of the known right iliac, right obturator ring, and bilateral sacral f
ractures. Subacute pelvic hematoma similar to 11/11/2022. No specific CT findings to explain increasi
ng pain.

## 2022-11-13 NOTE — ER
Nurse's Notes                                                                                     

 [facility]                                                                 

Name: Nellie Malcolm                                                                                

Age: 82 yrs                                                                                       

Sex: Female                                                                                       

: 1940                                                                                   

MRN: [id number]                                                                                   

Arrival Date: 2022                                                                          

Time: 16:15                                                                                       

Account#: [id number]                                                                            

Bed 17                                                                                            

Private MD:                                                                                       

Diagnosis: Fall on same level, unspecified;Fracture of other parts of pelvis-right iliac fx, right

  obturatorring, bilateral sacral fractures, subacute pelvic hematoma;Fracture of first           

  lumbar vertebra-left transverse fracture, lumbar 5                                              

                                                                                                  

Presentation:                                                                                     

                                                                                             

16:17 Chief complaint: EMS states: "She was released from a [facility] yesterday and is ss  

      still complaining of a lot of pain.". Coronavirus screen: Client denies travel out of       

      the [place] in the last 14 days. Ebola Screen: Patient denies exposure to infectious           

      person. Patient denies travel to an Ebola-affected area in the 21 days before illness       

      onset. Initial Sepsis Screen: Does the patient meet any 2 criteria? No. Patient's           

      initial sepsis screen is negative. Does the patient have a suspected source of              

      infection? No. Patient's initial sepsis screen is negative. Risk Assessment: Do you         

      want to hurt yourself or someone else? Patient reports no desire to harm self or            

      others. Onset of symptoms is unknown.                                                       

16:17 Method Of Arrival: EMS: [facility]  

16:17 Acuity: STEVIE 3                                                                           ss  

                                                                                                  

Triage Assessment:                                                                                

16:20 General: Appears uncomfortable, Behavior is cooperative, appropriate for age, anxious.  bp  

      Pain: Complains of pain in right low back and right mid back and left mid back and left     

      low back and lumbar area and pelvis and buttocks and coccyx. EENT: No deficits noted.       

      Neuro: No deficits noted. Cardiovascular: No deficits noted. Respiratory: No deficits       

      noted. GI: No signs and/or symptoms were reported involving the gastrointestinal            

      system. : No signs and/or symptoms were reported regarding the genitourinary system.      

      Derm: No deficits noted. Musculoskeletal: No deficits noted.                                

                                                                                                  

Historical:                                                                                       

- PMHx:                                                                                           

16:19 Atrial fibrillation; Hypertensive disorder;                                             ss  

                                                                                                  

- Immunization history:: Client reports having NOT received the Covid vaccine.                    

- Social history:: Smoking status: Patient denies any tobacco usage or history of.                

- Family history:: not pertinent.                                                                 

                                                                                                  

                                                                                                  

Screenin:29 Abuse screen: Denies threats or abuse. Denies injuries from another. Nutritional        bp  

      screening: No deficits noted. Tuberculosis screening: No symptoms or risk factors           

      identified. Fall Risk None identified.                                                      

                                                                                                  

Assessment:                                                                                       

16:30 General: SEE TRIAGE NOTE.                                                               bp  

17:28 Reassessment: No changes from previously documented assessment. Patient and/or family   bp  

      updated on plan of care and expected duration. Pain level reassessed.                       

18:14 Reassessment: PT REFUSING TRANSFER, MD AWARE.                                           bp  

18:18 Reassessment: SON: LUKAS ROSAS [phone].                                       bp  

                                                                                                  

Vital Signs:                                                                                      

16:25  / 107; Pulse 65; Resp 17; Temp 98.4(O); Pulse Ox 94% on R/A; Weight 67.59 kg;    ss  

      Height 5 ft. 3 in. (160.02 cm); Pain 7/10;                                                  

18:30  / 94; Pulse 73; Resp 16; Pulse Ox 91% ;                                          bp  

16:25 Body Mass Index 26.39 (67.59 kg, 160.02 cm)                                               

                                                                                                  

ED Course:                                                                                        

16:15 Patient arrived in ED.                                                                  amish 

16:15 Cleveland De La Torre MD is Attending Physician.                                             amish 

16:19 Triage completed.                                                                       ss  

16:25 Quinten Castro, RN is Primary Nurse.                                                    bp  

16:25 Arm band placed on right wrist.                                                         ss  

16:31 transfer intiated by Dr. De La Torre with Samina Vallejo Rn from the [facility]  

      Transfer Center.                                                                            

16:45 Inserted saline lock: 22 gauge in right hand, using aseptic technique. Blood collected. bp  

17:06 administrative approval given by Samina Vallejo Rn/ patient has been accepted to         [facility]/ Dr. ANIKA Valladares has accepted the patient in transfer/ report to     

      be called to [phone].                                                                  

17:34 Pelvis XRAY In Process Unspecified.                                                     EDMS

17:37 CT Lumbar Spine Wo Con In Process Unspecified.                                          EDMS

17:38 CT Pelvis wo Cont In Process Unspecified.                                               EDMS

18:25 MACKENZIE Archuleta MD is Hospitalizing Provider.                                                  amish 

18:59 No provider procedures requiring assistance completed. Patient admitted, IV remains in  bp  

      place.                                                                                      

                                                                                                  

Administered Medications:                                                                         

17:10 Drug: NS 0.9% 1000 ml Route: IV; Rate: 125 ml/hr; Site: right hand;                     bp  

17:10 Drug: morphine 2 mg Route: IVP; Infused Over: 4 mins; Site: right hand;                 bp  

17:10 Drug: morphine 2 mg Route: IVP; Infused Over: 4 mins; Site: right hand;                 bp  

17:10 Drug: Zofran (Ondansetron) 4 mg Route: IVP; Site: right hand;                           bp  

19:52 Drug: Lovenox (enoxaparin) 40 mg Route: Sub-Q; Site: abdomen;                           bp  

19:53 Drug: Rocephin (cefTRIAXone) 1 grams Route: IV; Rate: per protocol; Site: right hand;   bp  

                                                                                                  

                                                                                                  

Outcome:                                                                                          

18:32 Decision to Hospitalize by Provider.                                                    amish 

                                                                                             

08:32 Patient left the ED.                                                                    iw  

                                                                                                  

Signatures:                                                                                       

Dispatcher [employer]                           EDMS                                                 

Cleveland De La Torre MD MD cha Williams, Irene, RN RN iw Smirch, Shelby, RN RN ss Peltier, Brian, RN                      RN   Criss Villa                                                   

                                                                                                  

**************************************************************************************************

## 2022-11-13 NOTE — RAD REPORT
EXAM DESCRIPTION:  RAD - Pelvis - 11/13/2022 5:32 pm

 

CLINICAL HISTORY:  PAIN

 

COMPARISON:  Pelvis dated 11/11/2022

 

FINDINGS/IMPRESSION:  Comminuted right obturator ring fracture which is displaced similar to 11/11/20
22. Bilateral sacral fractures. Right iliac wing fracture. No significant change compared with 11/11/
2022. No new fractures identified. The femoral heads are located on these views.

## 2022-11-13 NOTE — EDPHYS
Physician Documentation                                                                           

 [facility]                                                                 

Name: Nellie Malcolm                                                                                

Age: 82 yrs                                                                                       

Sex: Female                                                                                       

: 1940                                                                                   

MRN: [id number]                                                                                   

Arrival Date: 2022                                                                          

Time: 16:15                                                                                       

Account#: [id number]                                                                            

Bed 17                                                                                            

Private MD:                                                                                       

ED Physician Cleveland De La Torre                                                                      

HPI:                                                                                              

                                                                                             

16:30 This 82 yrs old  Female presents to ER via EMS with complaints of Fall Injury. amish 

16:30 The patient presents with pain that is acute, with no known mechanism of injury. The    amish 

      symptoms are located in the low back. Onset: The symptoms/episode began/occurred 20         

      day(s) ago. The pain does not radiate. Associated signs and symptoms: Pertinent             

      positives:. The problem was sustained during a fall. Details of fall: The patient fell      

      from a height. Onset: The symptoms/episode began/occurred 20 day(s) ago. Associated         

      injuries: The patient sustained coccyx and pelvis, contusion, decreased range of            

      motion, obvious fracture, painful injury, swelling.                                         

                                                                                                  

Historical:                                                                                       

- PMHx:                                                                                           

16:19 Atrial fibrillation; Hypertensive disorder;                                             ss  

                                                                                                  

- Immunization history:: Client reports having NOT received the Covid vaccine.                    

- Social history:: Smoking status: Patient denies any tobacco usage or history of.                

- Family history:: not pertinent.                                                                 

                                                                                                  

                                                                                                  

ROS:                                                                                              

16:37 Constitutional: Negative for fever, chills, and weight loss, Eyes: Negative for injury, amish 

      pain, redness, and discharge, ENT: Negative for injury, pain, and discharge, Neck:          

      Negative for injury, pain, and swelling, Cardiovascular: Negative for chest pain,           

      palpitations, and edema, Respiratory: Negative for shortness of breath, cough,              

      wheezing, and pleuritic chest pain, Abdomen/GI: Negative for abdominal pain, nausea,        

      vomiting, diarrhea, and constipation, : Negative for injury, bleeding, discharge, and     

      swelling, MS/Extremity: Negative for injury and deformity, Skin: Negative for injury,       

      rash, and discoloration, Neuro: Negative for headache, weakness, numbness, tingling,        

      and seizure, Psych: Negative for depression, anxiety, suicide ideation, homicidal           

      ideation, and hallucinations, Allergy/Immunology: Negative for hives, rash, and             

      allergies, Endocrine: Negative for neck swelling, polydipsia, polyuria, polyphagia, and     

      marked weight changes, Hematologic/Lymphatic: Negative for swollen nodes, abnormal          

      bleeding, and unusual bruising.                                                             

16:37 Back: Positive for pain with movement, radiated pain, of the lumbar area.                   

                                                                                                  

Exam:                                                                                             

16:37 Constitutional:  This is a well developed, well nourished patient who is awake, alert,  amish 

      and in no acute distress. Head/Face:  Normocephalic, atraumatic. Eyes:  Pupils equal        

      round and reactive to light, extra-ocular motions intact.  Lids and lashes normal.          

      Conjunctiva and sclera are non-icteric and not injected.  Cornea within normal limits.      

      Periorbital areas with no swelling, redness, or edema. ENT:  Nares patent. No nasal         

      discharge, no septal abnormalities noted.  Tympanic membranes are normal and external       

      auditory canals are clear.  Oropharynx with no redness, swelling, or masses, exudates,      

      or evidence of obstruction, uvula midline.  Mucous membranes moist. Neck:  Trachea          

      midline, no thyromegaly or masses palpated, and no cervical lymphadenopathy.  Supple,       

      full range of motion without nuchal rigidity, or vertebral point tenderness.  No            

      Meningismus. Chest/axilla:  Normal chest wall appearance and motion.  Nontender with no     

      deformity.  No lesions are appreciated. Cardiovascular:  Regular rate and rhythm with a     

      normal S1 and S2.  No gallops, murmurs, or rubs.  Normal PMI, no JVD.  No pulse             

      deficits. Respiratory:  Lungs have equal breath sounds bilaterally, clear to                

      auscultation and percussion.  No rales, rhonchi or wheezes noted.  No increased work of     

      breathing, no retractions or nasal flaring. Abdomen/GI:  Soft, non-tender, with normal      

      bowel sounds.  No distension or tympany.  No guarding or rebound.  No evidence of           

      tenderness throughout. Female :  Normal external genitalia. Skin:  Warm, dry with         

      normal turgor.  Normal color with no rashes, no lesions, and no evidence of cellulitis.     

      MS/ Extremity:  Pulses equal, no cyanosis.  Neurovascular intact.  Full, normal range       

      of motion. Neuro:  Awake and alert, GCS 15, oriented to person, place, time, and            

      situation.  Cranial nerves II-XII grossly intact.  Motor strength 5/5 in all                

      extremities.  Sensory grossly intact.  Cerebellar exam normal.  Normal gait. Psych:         

      Awake, alert, with orientation to person, place and time.  Behavior, mood, and affect       

      are within normal limits.                                                                   

16:37 Back: ROM is painful, normal spinal alignment noted, CVA tenderness, is absent,             

      vertebral tenderness, is not appreciated, muscle spasm, is appreciated in the left low      

      back, left mid back, right mid back and right low back.                                     

                                                                                                  

Vital Signs:                                                                                      

16:25  / 107; Pulse 65; Resp 17; Temp 98.4(O); Pulse Ox 94% on R/A; Weight 67.59 kg;    ss  

      Height 5 ft. 3 in. (160.02 cm); Pain 7/10;                                                  

18:30  / 94; Pulse 73; Resp 16; Pulse Ox 91% ;                                          bp  

16:25 Body Mass Index 26.39 (67.59 kg, 160.02 cm)                                             ss  

                                                                                                  

MDM:                                                                                              

16:15 Patient medically screened.                                                             [facility] 

16:39 Differential diagnosis: Fatigue Fracture Joint Injury Osteoarthritis Osteomalacia       amish 

      Osteoporosis spinal injury, sprain, vertebral fracture. Differential diagnosis:             

      contusion, fracture, multiple trauma. Data reviewed: vital signs, nurses notes, lab         

      test result(s), radiologic studies, CT scan, plain films. Data interpreted: Cardiac         

      monitor: rate is 65 beats/min, rhythm is regular, Pulse oximetry: on room air is 94 %.      

      Test interpretation: by ED physician or midlevel provider: plain radiologic studies.        

      Counseling: I had a detailed discussion with the patient and/or guardian regarding: the     

      historical points, exam findings, and any diagnostic results supporting the                 

      discharge/admit diagnosis, lab results, radiology results.                                  

                                                                                                  

                                                                                             

16:29 Order name: CBC with Diff; Complete Time: 18:22                                         [facility] 

                                                                                             

16:29 Order name: Comprehensive Metabolic Panel                                               [facility] 

                                                                                             

16:29 Order name: SARS RAPID; Complete Time: 18:22                                            [facility] 

                                                                                             

18:47 Order name: Urine Culture                                                               [facility] 

                                                                                             

02:59 Order name: CBC with Automated Diff                                                     [facility]

                                                                                             

03:26 Order name: Basic Metabolic Panel                                                       [facility]

                                                                                             

16:29 Order name: Pelvis XRAY; Complete Time: 18:22                                           [facility] 

                                                                                             

16:37 Order name: CT Pelvis wo Cont; Complete Time: 17:55                                       

                                                                                             

16:37 Order name: CT Lumbar Spine Wo Con; Complete Time: 18:22                                  

                                                                                             

18:40 Order name: US Extremity Venous W Compression Guzman                                       ds4 

                                                                                             

20:27 Order name: US                                                                          [facility]

                                                                                             

18:41 Order name: Labs - recollect needed: green top only; Complete Time: 18:59               ss  

                                                                                             

18:45 Order name: CONS Physician Consult                                                      [facility]

                                                                                                  

Administered Medications:                                                                         

17:10 Drug: NS 0.9% 1000 ml Route: IV; Rate: 125 ml/hr; Site: right hand;                     bp  

17:10 Drug: morphine 2 mg Route: IVP; Infused Over: 4 mins; Site: right hand;                 bp  

17:10 Drug: morphine 2 mg Route: IVP; Infused Over: 4 mins; Site: right hand;                 bp  

17:10 Drug: Zofran (Ondansetron) 4 mg Route: IVP; Site: right hand;                           bp  

19:52 Drug: Lovenox (enoxaparin) 40 mg Route: Sub-Q; Site: abdomen;                           bp  

19:53 Drug: Rocephin (cefTRIAXone) 1 grams Route: IV; Rate: per protocol; Site: right hand;   bp  

                                                                                                  

                                                                                                  

Disposition Summary:                                                                              

22 18:32                                                                                    

Hospitalization Ordered                                                                           

      Hospitalization Status: Inpatient Admission                                             amish 

      Provider: MACKENZIE Archuleta cha 

      Condition: Stable                                                                       amish 

      Problem: new                                                                            amish 

      Symptoms: have improved                                                                 amish 

      Bed/Room Type: Standard                                                                 amish 

      Location: Telemetry/MedSurg (Inpatient)(22 07:26)                                 ja 

      Room Assignment: [facility](22 07:26)                                                    [facility] 

      Diagnosis                                                                                   

        - Fall on same level, unspecified                                                     amish 

        - Fracture of other parts of pelvis - right iliac fx, right obturatorring, bilateral  amish 

      sacral fractures, subacute pelvic hematoma                                                  

        - Fracture of first lumbar vertebra - left transverse fracture, lumbar 5              amish 

      Forms:                                                                                      

        - Medication Reconciliation Form                                                      amish 

        - SBAR form                                                                           amish 

Signatures:                                                                                       

Dispatcher [employer]                           EDMS                                                 

Cleveland De La Torre MD MD cha Smirch, Shelby RN                      RN   Lakeisha Chaparro RN                       RN                                                      

Hank Reed RN                       RN   ja1                                                  

Quinten Castro RN                      RN   bp                                                   

                                                                                                  

Corrections: (The following items were deleted from the chart)                                    

18:45 18:32 Telemetry/MedSurg (Inpatient) amish                                                   

18:45 18:32 amish                                                                               cg  

                                                                                             

07:26  18:45 [facility] ER HOLD cg                                                             ja1 

                                                                                             

07:26  18:45 ERHOLD- cg                                                                  ja1 

                                                                                                  

**************************************************************************************************

## 2022-11-14 LAB
BUN BLD-MCNC: 16 MG/DL (ref 7–18)
GLUCOSE SERPLBLD-MCNC: 96 MG/DL (ref 74–106)
HCT VFR BLD CALC: 31.9 % (ref 36–45)
LYMPHOCYTES # SPEC AUTO: 0.8 K/UL (ref 0.7–4.9)
MCV RBC: 94.6 FL (ref 80–100)
PMV BLD: 7.8 FL (ref 7.6–11.3)
POTASSIUM SERPL-SCNC: 3.8 MMOL/L (ref 3.5–5.1)
RBC # BLD: 3.37 M/UL (ref 3.86–4.86)

## 2022-11-14 RX ADMIN — Medication SCH ML: at 20:26

## 2022-11-14 RX ADMIN — HYDROCODONE BITARTRATE AND ACETAMINOPHEN SCH TAB: 7.5; 325 TABLET ORAL at 10:58

## 2022-11-14 RX ADMIN — AMIODARONE HYDROCHLORIDE SCH MG: 200 TABLET ORAL at 20:22

## 2022-11-14 RX ADMIN — FAMOTIDINE SCH MG: 10 INJECTION, SOLUTION INTRAVENOUS at 10:50

## 2022-11-14 RX ADMIN — FAMOTIDINE SCH MG: 10 INJECTION, SOLUTION INTRAVENOUS at 20:21

## 2022-11-14 RX ADMIN — ATORVASTATIN CALCIUM SCH MG: 20 TABLET, FILM COATED ORAL at 20:22

## 2022-11-14 RX ADMIN — PANTOPRAZOLE SODIUM SCH MG: 40 TABLET, DELAYED RELEASE ORAL at 06:30

## 2022-11-14 RX ADMIN — SODIUM CHLORIDE SCH MLS: 0.9 INJECTION, SOLUTION INTRAVENOUS at 06:00

## 2022-11-14 RX ADMIN — SENNOSIDES AND DOCUSATE SODIUM SCH TAB: 8.6; 5 TABLET ORAL at 20:21

## 2022-11-14 RX ADMIN — SODIUM CHLORIDE TAB 1 GM SCH GM: 1 TAB at 10:54

## 2022-11-14 RX ADMIN — HYDROCODONE BITARTRATE AND ACETAMINOPHEN SCH TAB: 7.5; 325 TABLET ORAL at 20:21

## 2022-11-14 RX ADMIN — ESCITALOPRAM OXALATE SCH MG: 20 TABLET, FILM COATED ORAL at 10:51

## 2022-11-14 RX ADMIN — METOPROLOL TARTRATE SCH MG: 50 TABLET, FILM COATED ORAL at 20:22

## 2022-11-14 RX ADMIN — HYDROCODONE BITARTRATE AND ACETAMINOPHEN SCH: 7.5; 325 TABLET ORAL at 14:00

## 2022-11-14 RX ADMIN — ASPIRIN SCH MG: 81 TABLET, COATED ORAL at 10:53

## 2022-11-14 RX ADMIN — TRAZODONE HYDROCHLORIDE PRN MG: 50 TABLET ORAL at 20:22

## 2022-11-14 RX ADMIN — SODIUM CHLORIDE TAB 1 GM SCH GM: 1 TAB at 20:20

## 2022-11-14 RX ADMIN — Medication SCH: at 09:00

## 2022-11-14 RX ADMIN — SENNOSIDES AND DOCUSATE SODIUM SCH TAB: 8.6; 5 TABLET ORAL at 10:52

## 2022-11-14 RX ADMIN — AMIODARONE HYDROCHLORIDE SCH MG: 200 TABLET ORAL at 10:53

## 2022-11-14 RX ADMIN — ENOXAPARIN SODIUM SCH MG: 40 INJECTION SUBCUTANEOUS at 10:54

## 2022-11-14 RX ADMIN — METOPROLOL TARTRATE SCH MG: 50 TABLET, FILM COATED ORAL at 10:52

## 2022-11-14 NOTE — RAD REPORT
EXAM DESCRIPTION:  RAD - Tib Fib Right - 11/11/2022 11:44 pm

 

CLINICAL HISTORY:  PAIN.

 

COMPARISON:  None.

 

TECHNIQUE:  Two views of the right tibia/fibula: AP and lateral radiographs.

 

FINDINGS:  No acute osseous abnormality is identified. Alignment is maintained. No radiopaque foreign
 object in the soft tissues. Prominent calcific atherosclerosis. Small chronic plantar fascial enthes
ophyte. First metatarsal osteotomy screws.

 

IMPRESSION:  No acute osseous abnormality identified.

 

Electronically signed by:   Lauryn Harris MD   11/12/2022 12:09 AM CST Workstation: [id number]

 

 

 

Due to temporary technical issues with the PACS/Fluency reporting system, reports are being signed by
 the in house radiologists without review as a courtesy to insure prompt reporting. The interpreting 
radiologist is fully responsible for the content of the report.

## 2022-11-14 NOTE — EKG
Test Date:    2022-11-11               Test Time:    22:05:04

Technician:                                          

                                                     

MEASUREMENT RESULTS:                                       

Intervals:                                           

Rate:         62                                     

CA:           218                                    

QRSD:         70                                     

QT:           452                                    

QTc:          458                                    

Axis:                                                

P:            43                                     

CA:           218                                    

QRS:          76                                     

T:            56                                     

                                                     

INTERPRETIVE STATEMENTS:                                       

                                                     

Sinus rhythm with 1st degree AV block

Septal infarct, age undetermined

Abnormal ECG

Compared to ECG 10/25/2022 10:13:17

First degree AV block now present

Myocardial infarct finding now present

Atrial fibrillation no longer present

ST (T wave) deviation no longer present

Possible ischemia no longer present



Electronically Signed On 11-14-22 15:09:54 CST by Arnie Lin

## 2022-11-15 VITALS — OXYGEN SATURATION: 92 %

## 2022-11-15 RX ADMIN — ENOXAPARIN SODIUM SCH MG: 40 INJECTION SUBCUTANEOUS at 08:48

## 2022-11-15 RX ADMIN — AMLODIPINE BESYLATE SCH MG: 5 TABLET ORAL at 08:44

## 2022-11-15 RX ADMIN — SODIUM CHLORIDE TAB 1 GM SCH GM: 1 TAB at 21:06

## 2022-11-15 RX ADMIN — HYDROCODONE BITARTRATE AND ACETAMINOPHEN SCH TAB: 7.5; 325 TABLET ORAL at 21:06

## 2022-11-15 RX ADMIN — ASPIRIN SCH MG: 81 TABLET, COATED ORAL at 08:44

## 2022-11-15 RX ADMIN — AMIODARONE HYDROCHLORIDE SCH MG: 200 TABLET ORAL at 08:48

## 2022-11-15 RX ADMIN — Medication SCH ML: at 21:18

## 2022-11-15 RX ADMIN — SODIUM CHLORIDE TAB 1 GM SCH GM: 1 TAB at 08:43

## 2022-11-15 RX ADMIN — ATORVASTATIN CALCIUM SCH MG: 20 TABLET, FILM COATED ORAL at 21:06

## 2022-11-15 RX ADMIN — FAMOTIDINE SCH MG: 20 TABLET, FILM COATED ORAL at 21:04

## 2022-11-15 RX ADMIN — TRAZODONE HYDROCHLORIDE PRN MG: 50 TABLET ORAL at 21:07

## 2022-11-15 RX ADMIN — HYDROCODONE BITARTRATE AND ACETAMINOPHEN SCH TAB: 7.5; 325 TABLET ORAL at 14:46

## 2022-11-15 RX ADMIN — AMIODARONE HYDROCHLORIDE SCH MG: 200 TABLET ORAL at 21:04

## 2022-11-15 RX ADMIN — SENNOSIDES AND DOCUSATE SODIUM SCH TAB: 8.6; 5 TABLET ORAL at 21:06

## 2022-11-15 RX ADMIN — FAMOTIDINE SCH MG: 20 TABLET, FILM COATED ORAL at 08:48

## 2022-11-15 RX ADMIN — SENNOSIDES AND DOCUSATE SODIUM SCH TAB: 8.6; 5 TABLET ORAL at 08:43

## 2022-11-15 RX ADMIN — HYDROCODONE BITARTRATE AND ACETAMINOPHEN SCH TAB: 7.5; 325 TABLET ORAL at 08:43

## 2022-11-15 RX ADMIN — AMLODIPINE BESYLATE SCH MG: 5 TABLET ORAL at 21:16

## 2022-11-15 RX ADMIN — Medication SCH ML: at 08:48

## 2022-11-15 RX ADMIN — ESCITALOPRAM OXALATE SCH MG: 20 TABLET, FILM COATED ORAL at 08:44

## 2022-11-15 RX ADMIN — METOPROLOL TARTRATE SCH MG: 50 TABLET, FILM COATED ORAL at 08:44

## 2022-11-15 RX ADMIN — PANTOPRAZOLE SODIUM SCH MG: 40 TABLET, DELAYED RELEASE ORAL at 06:10

## 2022-11-15 RX ADMIN — METOPROLOL TARTRATE SCH MG: 50 TABLET, FILM COATED ORAL at 21:17

## 2022-11-15 NOTE — HP
Date of Admission:  2022



Chief Complaint:  Hip pain.



History Of Present Illness:  This is an 82-year-old very pleasant female patient who was recently in 
the hospital with pelvis fracture and fracture of her sacral ala.  The patient was discharged to [facility] with hydrocodone and on outpatient basis, meloxicam was added for her pain control.  Last week, s
he came to emergency room.  This was, I believe, on Friday and she came into ER because of worsening 
of the pain and after further evaluation and further imaging studies, she was sent to [place] to [facility] where she was evaluated and she was told no need for any surgical intervention 
and she was sent home.  Yesterday evening, she came back to our emergency room with worsening of her 
pain and reported that her pain medication was not helping and she was evaluated in the ER and this t
bijan, ER physician contacted me and I did discuss details with the ER physician and I did request ER yumiko sullivan to call [facility] to see if they would take her back for any reconsideration
 of surgical intervention or not and he did communicate with the orthopedic surgeon on call and they 
suggested that they will be willing to accept the patient and consider if there is any surgical inter
vention can be provided, but the patient refused to go to [place] as she reported to the ER physician
 at our hospital that she will not consider any surgery and she was actually ambulating in the emerge
ncy room and with that, decision was made to admit to our hospital for further management of this michele
oing pain with her fracture problem.  I saw her this morning, she was still in the emergency room.



Allergies:  TO BUSPIRONE, MADE HER TO FEEL CRAZY AS SHE DESCRIBES AND CIPRO CAUSED CHEST PAIN.



Medications:  Norco 5 mg 3 times a day as needed for pain; Meloxicam 15 mg daily; amiodarone 200 mg 2
 times a day; aspirin 81 mg daily; atorvastatin 20 mg daily at bedtime; vitamin D3 5000 units daily; 
escitalopram 10 mg daily; Haldol 2 mg 2 times a day; metoprolol tartrate 50 mg 2 times a day; Zyprexa
 5 mg daily at bedtime; trazodone 50 mg, the patient takes half a tablet at bedtime as needed for sle
ep; pantoprazole 40 mg daily.



Review of Systems:

Musculoskeletal:  Back pain and hip pain. 

Cardiovascular:  Leg swelling. 

All other systems reviewed and negative.



Past Medical History:  Significant for allergic rhinitis, hypothyroidism, hypertension, hyperlipidemi
a, probable coronary artery disease, diverticulosis, anxiety, depression, osteoporosis, hyponatremia.




Past Surgical History:  Tonsillectomy, back surgery, knee surgery and removal of lipoma from back.



Family History:  Father  from aortic aneurysm problem.  Mother had dementia.



Social History:  Negative for smoking.  Negative for alcohol use.



Physical Examination:

Vital Signs:  This morning, temperature 97.8, pulse 76, respiratory rate 17, blood pressure 173/82, o
xygen saturation 94%.  Height 5 feet 3 inches, weight 149 pounds. 

General:  Awake, alert, oriented, not in distress. 

HEENT:  Head atraumatic, normocephalic.  Conjunctivae nonerythematous.  Sclerae white.  Mouth, no thr
ush or edema noted.  Ears/Nose, no mass, lesion, discharge noted. 

Neck:  Supple. No JVD, lymph nodes, bruit, thyromegaly noted. 

Lungs:  Bilateral good equal air entry. Clear to auscultation. No rhonchi.  No rales. 

Heart:  Normal heart sounds, no murmur or gallop. 

Abdomen:  Soft, bowel sounds normal. No guarding, rigidity, tenderness, mass, hepatosplenomegaly, dis
tention, or bruit noted. 

Extremities:  No leg edema.  No calf tenderness. 

Skin:  No rash, ulcer, cellulitis. 

Lymphatics:  No lymph node enlargement in neck, supraclavicular, infraclavicular region. 

Neuro:  No focal neurological deficit. 

Chest:  Unremarkable. 

External Genitalia:  Deferred. 

Rectal:  Deferred.



Laboratory Data:  Yesterday white count 7.4, hemoglobin 11.5, platelets 290.  This morning, white cou
nt 6.7, hemoglobin 10.7, platelets 290.  Yesterday, sodium 139, potassium 4, chloride 105, bicarb 26,
 BUN 18, creatinine 0.74, glucose 114.  Liver function tests unremarkable.  This morning, sodium 137,
 potassium 3.8, chloride 103, bicarb 27, BUN 16, creatinine 0.69, glucose 90.  Venous Doppler of leg 
was negative for DVT.  Pelvis x-ray shows fracture of the right obturator ring fracture, which is dis
placed similar to 2022, bilateral sacral fractures, right iliac wing fracture.  No significant 
change from 2022.  No new fracture identified.  CAT scan of the lumbar spine showed known displ
aced L5 transverse process fracture.  CAT scan of the pelvis shows right iliac, right obturator ring 
and bilateral sacral fractures.  Pelvic hematoma is similar in appearance compared to prior imaging.



Impression:  

1.Fracture, sacral ala.

2.Fracture, superior and inferior pubic rami and right pelvis.

3.Fracture, L1 transverse process.

4.Hyponatremia.

5.Hypertension.

6.Hyperlipidemia.

7.Allergic rhinitis.

8.Anxiety.

9.Depression.

10.Osteoporosis.

11.Probable coronary artery disease.



Plan:  We will admit the patient to hospital for further evaluation and management of this problem.  
Patient is appropriate for inpatient and is expected to spend 2 midnights in hospital.  DVT prophylax
is will be given using Lovenox.  For hypertension, we will continue antihypertensive therapy per orde
r.  For hyperlipidemia, continue her statin therapy per order.  Continue aspirin.  Pain management wi
ll be provided using hydrocodone, but we will increase dose from 5 mg to 7.5 mg and also use morphine
 and Zofran as needed.  Consult orthopedic surgeon, Dr. Pinzon, who had evaluated her during last [facility] stay and we will also consult Physical Therapy and I have recommended the patient to consider inp
atient rehab if she qualifies for that.  We will continue her medication for anxiety and depression, 
which is her Zyprexa, Haldol and escitalopram.  I did try to reach out to patient's 

son to give updates in and he was not available and I will try to reach out to him tomorrow.





GABY/MODL

DD:  2022 19:52:57Voice ID:  [id number]

## 2022-11-15 NOTE — EKG
Test Date:    2022-11-11               Test Time:    22:05:40

Technician:                                          

                                                     

MEASUREMENT RESULTS:                                       

Intervals:                                           

Rate:         62                                     

UT:           198                                    

QRSD:         74                                     

QT:           452                                    

QTc:          458                                    

Axis:                                                

P:            68                                     

UT:           198                                    

QRS:          82                                     

T:            82                                     

                                                     

INTERPRETIVE STATEMENTS:                                       

                                                     

Normal sinus rhythm

Septal infarct, age undetermined

Possible Lateral infarct, age undetermined

Abnormal ECG

Compared to ECG 11/11/2022 22:05:04

First degree AV block no longer present

Myocardial infarct finding still present



Electronically Signed On 11-15-22 08:20:48 CST by Isaiah Fischer

## 2022-11-16 ENCOUNTER — HOSPITAL ENCOUNTER (INPATIENT)
Dept: HOSPITAL 97 - 5TH | Age: 82
LOS: 7 days | Discharge: NURSING, REHAB OR LONG-TERM CARE | DRG: 560 | End: 2022-11-23
Attending: INTERNAL MEDICINE | Admitting: INTERNAL MEDICINE
Payer: COMMERCIAL

## 2022-11-16 VITALS — DIASTOLIC BLOOD PRESSURE: 91 MMHG | SYSTOLIC BLOOD PRESSURE: 186 MMHG

## 2022-11-16 VITALS — BODY MASS INDEX: 23.8 KG/M2

## 2022-11-16 VITALS — TEMPERATURE: 98.7 F

## 2022-11-16 DIAGNOSIS — S32.10XD: Primary | ICD-10-CM

## 2022-11-16 DIAGNOSIS — S32.301D: ICD-10-CM

## 2022-11-16 DIAGNOSIS — Z20.822: ICD-10-CM

## 2022-11-16 DIAGNOSIS — F03.90: ICD-10-CM

## 2022-11-16 DIAGNOSIS — S30.0XXA: ICD-10-CM

## 2022-11-16 DIAGNOSIS — S32.511D: ICD-10-CM

## 2022-11-16 DIAGNOSIS — K59.00: ICD-10-CM

## 2022-11-16 DIAGNOSIS — I25.10: ICD-10-CM

## 2022-11-16 DIAGNOSIS — E03.9: ICD-10-CM

## 2022-11-16 DIAGNOSIS — D64.9: ICD-10-CM

## 2022-11-16 DIAGNOSIS — Z79.899: ICD-10-CM

## 2022-11-16 DIAGNOSIS — F41.9: ICD-10-CM

## 2022-11-16 DIAGNOSIS — E87.6: ICD-10-CM

## 2022-11-16 DIAGNOSIS — F33.1: ICD-10-CM

## 2022-11-16 DIAGNOSIS — E46: ICD-10-CM

## 2022-11-16 DIAGNOSIS — Z79.890: ICD-10-CM

## 2022-11-16 DIAGNOSIS — E78.5: ICD-10-CM

## 2022-11-16 DIAGNOSIS — J30.9: ICD-10-CM

## 2022-11-16 DIAGNOSIS — K57.92: ICD-10-CM

## 2022-11-16 DIAGNOSIS — F05: ICD-10-CM

## 2022-11-16 DIAGNOSIS — S32.059D: ICD-10-CM

## 2022-11-16 DIAGNOSIS — M81.0: ICD-10-CM

## 2022-11-16 DIAGNOSIS — H53.9: ICD-10-CM

## 2022-11-16 DIAGNOSIS — R41.9: ICD-10-CM

## 2022-11-16 DIAGNOSIS — I10: ICD-10-CM

## 2022-11-16 DIAGNOSIS — E87.1: ICD-10-CM

## 2022-11-16 LAB
BUN BLD-MCNC: 14 MG/DL (ref 7–18)
GLUCOSE SERPLBLD-MCNC: 92 MG/DL (ref 74–106)
HCT VFR BLD CALC: 33.9 % (ref 36–45)
LYMPHOCYTES # SPEC AUTO: 0.5 K/UL (ref 0.7–4.9)
MAGNESIUM SERPL-MCNC: 1.9 MG/DL (ref 1.8–2.4)
MCV RBC: 92.2 FL (ref 80–100)
PMV BLD: 7 FL (ref 7.6–11.3)
POTASSIUM SERPL-SCNC: 2.6 MMOL/L (ref 3.5–5.1)
RBC # BLD: 3.67 M/UL (ref 3.86–4.86)

## 2022-11-16 PROCEDURE — 83735 ASSAY OF MAGNESIUM: CPT

## 2022-11-16 PROCEDURE — 84134 ASSAY OF PREALBUMIN: CPT

## 2022-11-16 PROCEDURE — 97542 WHEELCHAIR MNGMENT TRAINING: CPT

## 2022-11-16 PROCEDURE — 85025 COMPLETE CBC W/AUTO DIFF WBC: CPT

## 2022-11-16 PROCEDURE — 36415 COLL VENOUS BLD VENIPUNCTURE: CPT

## 2022-11-16 PROCEDURE — 97161 PT EVAL LOW COMPLEX 20 MIN: CPT

## 2022-11-16 PROCEDURE — 92523 SPEECH SOUND LANG COMPREHEN: CPT

## 2022-11-16 PROCEDURE — 81001 URINALYSIS AUTO W/SCOPE: CPT

## 2022-11-16 PROCEDURE — 97129 THER IVNTJ 1ST 15 MIN: CPT

## 2022-11-16 PROCEDURE — 97116 GAIT TRAINING THERAPY: CPT

## 2022-11-16 PROCEDURE — 97530 THERAPEUTIC ACTIVITIES: CPT

## 2022-11-16 PROCEDURE — 87086 URINE CULTURE/COLONY COUNT: CPT

## 2022-11-16 PROCEDURE — 82040 ASSAY OF SERUM ALBUMIN: CPT

## 2022-11-16 PROCEDURE — 80048 BASIC METABOLIC PNL TOTAL CA: CPT

## 2022-11-16 PROCEDURE — 97112 NEUROMUSCULAR REEDUCATION: CPT

## 2022-11-16 PROCEDURE — 97165 OT EVAL LOW COMPLEX 30 MIN: CPT

## 2022-11-16 PROCEDURE — 97110 THERAPEUTIC EXERCISES: CPT

## 2022-11-16 PROCEDURE — 87088 URINE BACTERIA CULTURE: CPT

## 2022-11-16 RX ADMIN — METOPROLOL TARTRATE SCH MG: 50 TABLET, FILM COATED ORAL at 08:44

## 2022-11-16 RX ADMIN — HYDROCODONE BITARTRATE AND ACETAMINOPHEN SCH TAB: 7.5; 325 TABLET ORAL at 08:45

## 2022-11-16 RX ADMIN — SODIUM CHLORIDE TAB 1 GM SCH GM: 1 TAB at 19:09

## 2022-11-16 RX ADMIN — SENNOSIDES AND DOCUSATE SODIUM SCH TAB: 8.6; 5 TABLET ORAL at 19:10

## 2022-11-16 RX ADMIN — AMLODIPINE BESYLATE SCH MG: 5 TABLET ORAL at 19:20

## 2022-11-16 RX ADMIN — AMIODARONE HYDROCHLORIDE SCH MG: 200 TABLET ORAL at 19:19

## 2022-11-16 RX ADMIN — ESCITALOPRAM OXALATE SCH MG: 20 TABLET, FILM COATED ORAL at 08:45

## 2022-11-16 RX ADMIN — PANTOPRAZOLE SODIUM SCH MG: 40 TABLET, DELAYED RELEASE ORAL at 06:31

## 2022-11-16 RX ADMIN — ASPIRIN SCH MG: 81 TABLET, COATED ORAL at 08:44

## 2022-11-16 RX ADMIN — SODIUM CHLORIDE TAB 1 GM SCH GM: 1 TAB at 08:45

## 2022-11-16 RX ADMIN — METOPROLOL TARTRATE SCH MG: 50 TABLET, FILM COATED ORAL at 19:21

## 2022-11-16 RX ADMIN — TRAMADOL HYDROCHLORIDE PRN MG: 50 TABLET, COATED ORAL at 11:35

## 2022-11-16 RX ADMIN — Medication SCH ML: at 08:45

## 2022-11-16 RX ADMIN — SENNOSIDES AND DOCUSATE SODIUM SCH TAB: 8.6; 5 TABLET ORAL at 08:44

## 2022-11-16 RX ADMIN — FAMOTIDINE SCH MG: 20 TABLET, FILM COATED ORAL at 08:44

## 2022-11-16 RX ADMIN — FAMOTIDINE SCH MG: 20 TABLET, FILM COATED ORAL at 19:09

## 2022-11-16 RX ADMIN — HYDROCODONE BITARTRATE AND ACETAMINOPHEN PRN TAB: 7.5; 325 TABLET ORAL at 19:21

## 2022-11-16 RX ADMIN — ATORVASTATIN CALCIUM SCH MG: 20 TABLET, FILM COATED ORAL at 19:09

## 2022-11-16 RX ADMIN — GABAPENTIN SCH MG: 300 CAPSULE ORAL at 19:18

## 2022-11-16 RX ADMIN — AMIODARONE HYDROCHLORIDE SCH MG: 200 TABLET ORAL at 08:44

## 2022-11-16 RX ADMIN — ENOXAPARIN SODIUM SCH MG: 40 INJECTION SUBCUTANEOUS at 08:45

## 2022-11-16 RX ADMIN — AMLODIPINE BESYLATE SCH MG: 5 TABLET ORAL at 08:48

## 2022-11-16 NOTE — PN
Date of Progress Note:  11/15/2022



Subjective:  Patient was seen this morning for followup.  No new complaints or problems reported by h
er.



Objective:  General:  Lying in bed, not in distress. 

Vital Signs:  Reviewed. 

HEENT:  Unremarkable. 

Lungs:  Clear to auscultation. 

Heart:  Sounds normal. 

Abdomen:  Soft.  Bowel sounds normal.  No guarding, rigidity, tenderness, or distention. 

Extremities:  No leg edema.



Impression:  

1.Fracture, right superior and inferior pubic rami.

2.Fracture, sacral ala.

3.Fracture, transverse process of lumbar spine.

4.Hypertension.

5.Hyponatremia.

6.Anxiety.

7.Depression.



Plan:  The patient reported that her pain was minimally better, but still had lot of pain.  Denies an
y new complaints.  We will continue her hydrocodone per order.  Continue morphine per order.  We will
 add muscle relaxant methocarbamol as per order.  We will continue current DVT prophylaxis.  Blood pr
essure remains elevated.  Continue current antihypertensive medication and add amlodipine 5 mg 2 time
s a day. Physical Therapy to continue to work with the patient and inpatient rehab was consulted and 
they have accepted the patient for admission 

for tomorrow.  I did call the patient's son and discussed all the details this evening.  I will see 
er tomorrow for followup.





GABY/MODL

DD:  11/15/2022 20:07:24Voice ID:  [id number]

DT:  11/16/2022 00:36:29Report ID:  [id number]

## 2022-11-17 LAB
ALBUMIN SERPL BCP-MCNC: 3 G/DL (ref 3.4–5)
BUN BLD-MCNC: 16 MG/DL (ref 7–18)
GLUCOSE SERPLBLD-MCNC: 98 MG/DL (ref 74–106)
HCT VFR BLD CALC: 37.6 % (ref 36–45)
LYMPHOCYTES # SPEC AUTO: 0.6 K/UL (ref 0.7–4.9)
MAGNESIUM SERPL-MCNC: 2 MG/DL (ref 1.8–2.4)
MCV RBC: 93.4 FL (ref 80–100)
PMV BLD: 7.3 FL (ref 7.6–11.3)
POTASSIUM SERPL-SCNC: 3.1 MMOL/L (ref 3.5–5.1)
PREALB SERPL-MCNC: 13.2 MG/DL (ref 20–40)
RBC # BLD: 4.03 M/UL (ref 3.86–4.86)

## 2022-11-17 RX ADMIN — ASPIRIN SCH MG: 81 TABLET, COATED ORAL at 07:09

## 2022-11-17 RX ADMIN — ATORVASTATIN CALCIUM SCH MG: 20 TABLET, FILM COATED ORAL at 19:40

## 2022-11-17 RX ADMIN — HYDROCODONE BITARTRATE AND ACETAMINOPHEN PRN TAB: 7.5; 325 TABLET ORAL at 05:20

## 2022-11-17 RX ADMIN — Medication SCH: at 19:42

## 2022-11-17 RX ADMIN — TRAMADOL HYDROCHLORIDE PRN MG: 50 TABLET, COATED ORAL at 19:41

## 2022-11-17 RX ADMIN — PANTOPRAZOLE SODIUM SCH MG: 40 TABLET, DELAYED RELEASE ORAL at 05:20

## 2022-11-17 RX ADMIN — METOPROLOL TARTRATE SCH MG: 25 TABLET ORAL at 20:33

## 2022-11-17 RX ADMIN — ENOXAPARIN SODIUM SCH MG: 40 INJECTION SUBCUTANEOUS at 07:08

## 2022-11-17 RX ADMIN — SENNOSIDES AND DOCUSATE SODIUM SCH: 8.6; 5 TABLET ORAL at 19:42

## 2022-11-17 RX ADMIN — METOPROLOL TARTRATE SCH MG: 50 TABLET, FILM COATED ORAL at 07:08

## 2022-11-17 RX ADMIN — AMIODARONE HYDROCHLORIDE SCH MG: 200 TABLET ORAL at 07:20

## 2022-11-17 RX ADMIN — AMLODIPINE BESYLATE SCH MG: 5 TABLET ORAL at 19:40

## 2022-11-17 RX ADMIN — TRAMADOL HYDROCHLORIDE PRN MG: 50 TABLET, COATED ORAL at 08:22

## 2022-11-17 RX ADMIN — GABAPENTIN SCH MG: 300 CAPSULE ORAL at 08:18

## 2022-11-17 RX ADMIN — FAMOTIDINE SCH MG: 20 TABLET, FILM COATED ORAL at 07:09

## 2022-11-17 RX ADMIN — AMIODARONE HYDROCHLORIDE SCH MG: 200 TABLET ORAL at 19:42

## 2022-11-17 RX ADMIN — AMLODIPINE BESYLATE SCH MG: 5 TABLET ORAL at 07:09

## 2022-11-17 RX ADMIN — FAMOTIDINE SCH MG: 20 TABLET, FILM COATED ORAL at 19:41

## 2022-11-17 RX ADMIN — SODIUM CHLORIDE TAB 1 GM SCH GM: 1 TAB at 19:42

## 2022-11-17 RX ADMIN — SODIUM CHLORIDE TAB 1 GM SCH GM: 1 TAB at 08:18

## 2022-11-17 RX ADMIN — ESCITALOPRAM OXALATE SCH MG: 20 TABLET, FILM COATED ORAL at 07:20

## 2022-11-17 RX ADMIN — SENNOSIDES AND DOCUSATE SODIUM SCH: 8.6; 5 TABLET ORAL at 07:15

## 2022-11-17 NOTE — PN
Date of Progress Note:  11/17/2022



Face-to-face progress note visit



Subjective:  Ms. Malcolm is actually very drowsy at the time of my evaluation and it took about 3 or 4
 minutes to have her to be awake and sit at the side of the bed.  She does report some significant pa
in in the buttock region where she has pelvic fractures and I did mention to her that her pain medica
tions previously will be adjusted and neuromodulators added along with pain patch and muscle relaxant
s.



Review of Systems:

As noted, significant pain in the pelvic region.  She is somewhat sedated, but has good air movement.
  Her review of systems is otherwise negative on a 10 point scale.



Physical Examination:

Vital Signs:  Blood pressure 119/57, pulse 57, respiratory rate 16, temperature 97.4, oxygen saturati
on 98% on room air.  Weight 134 pounds 9.6 ounces, height 5 feet 3 inches, BMI 23.8. 

General:  Ms. Malcolm is sitting up on the side of the bed.  

Musculoskeletal:  She did stand for about 25-30 seconds, holding on to the walker with the support of
 the physical therapist.  She did say as she was sitting, she felt more pain in the pelvic region and
 transiently felt less pain as she stood up.  She did not have any focal weakness in the arms and leg
s, just giveaway due to pain and she was able to hold arms and legs individually off the bed and up i
n the air and against resistance.



Laboratory Studies:  Complete blood count with differential shows a normal white blood cell count and
 hemoglobin and hematocrit also normal.  Neutrophils slightly elevated at 76.9.  Chemistries show sli
ghtly low sodium of 135, potassium low at 3.1, prealbumin low at 13.2 and albumin low at 3.0.  



X-ray/Imaging:  None.



Medications:  Tylenol 650 mg every 6 hours, Norco 5/325 every 6 hours as needed, Cordarone 200 mg twi
ce daily, Norvasc 5 mg twice daily, aspirin 81 mg daily, Lipitor 20 mg at bedtime, Dulcolax 10 mg per
 rectum as needed, clonidine 0.1 mg 3 times daily for systolic blood pressure greater than 160, Loven
ox 40 mg subcutaneously daily, Lexapro 10 mg daily, Pepcid 20 mg twice daily, Haldol 2 mg twice a day
 as needed, lidocaine patch 2 patches daily to painful area in pelvic region, milk of magnesia 30 mL 
daily for constipation, Robaxin 500 mg q.6 hours for muscle spasm, Lopressor 25 mg twice daily, Ensur
e 237 mL twice daily, Zyprexa 5 mg at bedtime, Zofran 4 mg every 6 hours as needed, Protonix 40 mg da
endy as needed, potassium 10 mEq daily, Senokot-S 2 tablets twice daily, sodium chloride 1 g twice jayy
ly, tramadol 50 mg every 6 hours as needed, and Desyrel 25 mg at bedtime as needed.



Current Level Of Functioning:  Currently, Ms. Malcolm performed supine-to-sit transfers with maximum a
ssistance.  She did perform multiple sit-to-stand transfers with maximum assistance using a rolling w
alker.  She was unable to sit in the same position for more than about 8 seconds.  She did require co
ntinuous verbal cuing to encourage her to participate in therapy.  She actually was unable to fully p
articipate in all of her therapy today due to her being very sedated.  She would snore in the middle 
of a sentence when working with the therapists.  Medication adjustments were made to decrease any sed
ating medicines and also muscle relaxants and pain medications so that she may be more awake during t
herapy sessions. 



Progress toward rehabilitation goals:  Today she made slow progress given her significant sedation, w
hich is possibly related to multiple medications for pain and sleep.  Those medications will be decre
ased.



Assessment:  Ms. Malcolm is an 82-year-old patient admitted to the hospital with multiple fractures in
cluding pelvic fracture and transverse process fracture of L5.  She was very sedated today and was no
t able to fully participate in all therapy.  Her medication regimen will be adjusted to allow her to 
be able to participate more fully.  She has comorbid conditions, which include anemia, malnutrition, 
hypertension, risk of deep vein thrombosis, insomnia, constipation, hypertension, and again excessive
 sedation and her medications are being adjusted along with the help of her primary care physician Dr Maine Archuleta. 



Comorbidities that impact rehabilitation process:  As noted, she has significant pain and pain medica
tions will be addressed.  She did have 

significant sedation and medications again cut back for that and she has malnutrition and anemia and 
those conditions are being addressed.





DERECK/OSKAR

DD:  11/17/2022 19:27:02Voice ID:  [id number]

DT:  11/17/2022 23:36:46Report ID:  [id number]

## 2022-11-17 NOTE — PN
Date of Progress Note:  11/17/2022



Subjective:  The patient was seen this morning for followup.  No new complaints or problems reported 
by the patient.  She was lying in bed, sleeping this morning, as she did not sleep well last night un
til late night she started to sleep, so this morning when I saw her, she was sleeping very deep, not 
in any distress.



Objective:  Vital Signs:  Reviewed. 

HEENT:  Unremarkable. 

Lungs:  Clear to auscultation. 

Heart:  Sounds normal. 

Abdomen:  Soft.  Bowel sounds normal.  No guarding, rigidity, tenderness, or distention. 

Extremities:  No leg edema.



Laboratory Data:  White count 7.8, hemoglobin 12.3, platelets 352.  Sodium 135, potassium 3.1, chlori
de 98, bicarb 32, BUN 16, creatinine 0.70, glucose 98.



Impression:  

1.Fracture, right superior and inferior pubic rami.

2.Fracture, sacral ala.

3.Hypertension.

4.Anxiety.

5.Depression.



Plan:  We will go ahead and continue current antihypertensive medication.  Her blood pressure was karie
lly elevated after she received this morning's blood pressure medication.  It was still elevated and 
we ordered clonidine for p.r.n. use.  Continue current pain medication per order.  Continue current D
VT prophylaxis per order.  We will continue to monitor blood pressure and adjust antihypertensive med
ication as it may become necessary.  She did have bowel movement 

last night and will continue stool softener per order.  I will see her tomorrow for followup.





GABY/MODL

DD:  11/17/2022 19:01:55Voice ID:  [id number]

DT:  11/17/2022 22:18:32Report ID:  [id number]

## 2022-11-17 NOTE — DS
Date of Discharge:  11/16/2022



Disposition:  Discharged to go to inpatient rehab.



Physical Examination:

HEENT:  Unremarkable. 

Lungs:  Clear to auscultation. 

Heart:  Sounds normal. 

Abdomen:  Soft.  Bowel sounds normal.  No guarding, rigidity, tenderness, or distention. 

Extremities:  No leg edema.



Laboratory Data:  Today white count is 6.2, hemoglobin 11.5, and platelets 318.  Upon admission white
 count was 7.4, hemoglobin 11.5, and platelets 290.  Today sodium is 139, potassium 2.6, chloride 97,
 bicarb 29, BUN 14, creatinine 0.55, glucose 92, and magnesium 1.9.  Upon admission sodium was 139, p
otassium 4, chloride 105, bicarb 26, BUN 18, creatinine 0.74, and glucose 114.  Liver function tests 
unremarkable.



Discharge Medications/medications:  See copy of discharge order for details and see copy of transfer 
MAR for details.



Hospital Course:  This is an 82-year-old very pleasant female patient admitted to the hospital with h
ip pain.  Please see dictated H and P for more information.  The patient failed outpatient therapy an
d she was admitted to the hospital after she arrived to the hospital emergency room.  After she was a
dmitted to the hospital, started her pain management with increased dose of hydrocodone 7.5 mg 3 time
s a day and IV morphine as needed.  We also added muscle relaxant methocarbamol.  Physical Therapy wa
s consulted.  DVT prophylaxis was given using Lovenox.  Her home medications were continued.  Dr. Sandeep flood from Orthopedic Surgery was consulted.  We also consulted inpatient rehab and today the patient was
 transferred to inpatient rehab after she was accepted.  Her potassium was low this morning and accor
ding to potassium replacement protocol, replacement dose was ordered for the low potassium.  We will 
obviously continue to follow up on electrolytes while she is on the rehab floor.  I did call the randall
ent's son yesterday and details were discussed with him.



Final Diagnoses:  

1.Fracture, superior and inferior pubic rami and right pelvis.

2.Fracture, sacral ala.

3.Fracture, L1 transverse processes.

4.Hyponatremia.

5.Hypokalemia.

6.Hypertension.

7.Hyperlipidemia.

8.Allergic rhinitis.

9.Anxiety.

10.Depression.

11.Osteoporosis.

12.Probable coronary artery disease.





GABY/MODL

DD:  11/16/2022 19:50:17Voice ID:  [id number]

DT:  11/17/2022 18:00:25Report ID:  [id number]

## 2022-11-18 LAB
BUN BLD-MCNC: 16 MG/DL (ref 7–18)
GLUCOSE SERPLBLD-MCNC: 114 MG/DL (ref 74–106)
POTASSIUM SERPL-SCNC: 3.2 MMOL/L (ref 3.5–5.1)

## 2022-11-18 RX ADMIN — AMIODARONE HYDROCHLORIDE SCH MG: 200 TABLET ORAL at 08:57

## 2022-11-18 RX ADMIN — METOPROLOL TARTRATE SCH MG: 25 TABLET ORAL at 19:46

## 2022-11-18 RX ADMIN — SENNOSIDES AND DOCUSATE SODIUM SCH TAB: 8.6; 5 TABLET ORAL at 08:55

## 2022-11-18 RX ADMIN — HYDROCODONE BITARTRATE AND ACETAMINOPHEN PRN TAB: 5; 325 TABLET ORAL at 06:48

## 2022-11-18 RX ADMIN — ATORVASTATIN CALCIUM SCH MG: 20 TABLET, FILM COATED ORAL at 19:47

## 2022-11-18 RX ADMIN — METOPROLOL TARTRATE SCH MG: 25 TABLET ORAL at 08:57

## 2022-11-18 RX ADMIN — FAMOTIDINE SCH MG: 20 TABLET, FILM COATED ORAL at 19:45

## 2022-11-18 RX ADMIN — SODIUM CHLORIDE TAB 1 GM SCH GM: 1 TAB at 08:56

## 2022-11-18 RX ADMIN — AMLODIPINE BESYLATE SCH MG: 5 TABLET ORAL at 08:56

## 2022-11-18 RX ADMIN — Medication SCH: at 08:00

## 2022-11-18 RX ADMIN — SENNOSIDES AND DOCUSATE SODIUM SCH TAB: 8.6; 5 TABLET ORAL at 19:44

## 2022-11-18 RX ADMIN — Medication SCH ML: at 21:19

## 2022-11-18 RX ADMIN — AMLODIPINE BESYLATE SCH MG: 5 TABLET ORAL at 19:45

## 2022-11-18 RX ADMIN — ENOXAPARIN SODIUM SCH MG: 40 INJECTION SUBCUTANEOUS at 07:35

## 2022-11-18 RX ADMIN — HYDROCODONE BITARTRATE AND ACETAMINOPHEN PRN TAB: 5; 325 TABLET ORAL at 12:47

## 2022-11-18 RX ADMIN — AMIODARONE HYDROCHLORIDE SCH MG: 200 TABLET ORAL at 19:45

## 2022-11-18 RX ADMIN — ESCITALOPRAM OXALATE SCH MG: 20 TABLET, FILM COATED ORAL at 08:55

## 2022-11-18 RX ADMIN — PANTOPRAZOLE SODIUM SCH MG: 40 TABLET, DELAYED RELEASE ORAL at 06:36

## 2022-11-18 RX ADMIN — FAMOTIDINE SCH MG: 20 TABLET, FILM COATED ORAL at 08:56

## 2022-11-18 RX ADMIN — Medication SCH PATCH: at 08:19

## 2022-11-18 RX ADMIN — SODIUM CHLORIDE TAB 1 GM SCH GM: 1 TAB at 19:45

## 2022-11-18 RX ADMIN — ASPIRIN SCH MG: 81 TABLET, COATED ORAL at 08:56

## 2022-11-18 RX ADMIN — HYDROCODONE BITARTRATE AND ACETAMINOPHEN PRN TAB: 5; 325 TABLET ORAL at 01:05

## 2022-11-18 RX ADMIN — POTASSIUM CHLORIDE SCH MEQ: 10 TABLET, FILM COATED, EXTENDED RELEASE ORAL at 08:58

## 2022-11-18 RX ADMIN — TRAMADOL HYDROCHLORIDE PRN MG: 50 TABLET, COATED ORAL at 06:35

## 2022-11-18 RX ADMIN — TRAMADOL HYDROCHLORIDE PRN MG: 50 TABLET, COATED ORAL at 13:38

## 2022-11-18 RX ADMIN — HYDROCODONE BITARTRATE AND ACETAMINOPHEN PRN TAB: 5; 325 TABLET ORAL at 19:47

## 2022-11-18 NOTE — P.RH.PN
Estimated Length of Stay: 12


Expected Discharge Date: 11/24/22


Discharge Disposition Plan: Home


Family Support: Yes


Vital Signs: 


                                Last Vital Signs











Temp  97.9 F   11/18/22 07:30


 


Pulse  76   11/18/22 07:30


 


Resp  16   11/18/22 07:48


 


BP  181/86 H  11/18/22 07:30


 


Pulse Ox  92   11/18/22 07:48











Laboratory: 


                             Laboratory Last Values











WBC  7.80 K/uL (4.3-10.9)   11/17/22  04:14    


 


RBC  4.03 M/uL (3.86-4.86)   11/17/22  04:14    


 


Hgb  12.3 g/dL (12.0-15.0)   11/17/22  04:14    


 


Hct  37.6 % (36.0-45.0)   11/17/22  04:14    


 


MCV  93.4 fL ()   11/17/22  04:14    


 


MCH  30.5 pg (27.0-35.0)   11/17/22  04:14    


 


MCHC  32.6 g/dL (32.0-36.0)   11/17/22  04:14    


 


RDW  15.2 % (12.1-15.2)   11/17/22  04:14    


 


Plt Count  352 K/uL (152-406)   11/17/22  04:14    


 


MPV  7.3 fL (7.6-11.3)  L  11/17/22  04:14    


 


Neutrophils %  76.9 % (41.7-73.7)  H  11/17/22  04:14    


 


Lymphocytes %  8.4 % (15.3-44.8)  L  11/17/22  04:14    


 


Monocytes %  12.9 % (3.3-12.3)  H  11/17/22  04:14    


 


Eosinophils %  1.4 % (0-4.4)   11/17/22  04:14    


 


Basophils %  0.4 % (0-1.3)   11/17/22  04:14    


 


Absolute Neutrophils  6.0 K/uL (1.8-8.0)   11/17/22  04:14    


 


Absolute Lymphocytes  0.6 K/uL (0.7-4.9)  L  11/17/22  04:14    


 


Absolute Monocytes  1.0 K/uL (0.1-1.3)   11/17/22  04:14    


 


Absolute Eosinophils  0.1 K/uL (0-0.5)   11/17/22  04:14    


 


Absolute Basophils  0.0 K/uL (0-0.5)   11/17/22  04:14    


 


Sodium  135 mmol/L (136-145)  L  11/18/22  05:22    


 


Potassium  3.2 mmol/L (3.5-5.1)  L  11/18/22  05:22    


 


Chloride  98 mmol/L ()   11/18/22  05:22    


 


Carbon Dioxide  30 mmol/L (21-32)   11/18/22  05:22    


 


Anion Gap  10.2 mEq/L (5.0-15.0)   11/18/22  05:22    


 


BUN  16 mg/dL (7-18)   11/18/22  05:22    


 


Creatinine  0.61 mg/dL (0.55-1.3)   11/18/22  05:22    


 


Est GFR (CKD-EPI)  89 ml/min (=/>90)  L  11/18/22  05:22    


 


Glucose  114 mg/dL ()  H  11/18/22  05:22    


 


Calcium  8.7 mg/dL (8.5-10.1)   11/18/22  05:22    


 


Magnesium  2.0 mg/dL (1.8-2.4)   11/17/22  04:14    


 


Albumin  3.0 g/dL (3.4-5.0)  L  11/17/22  04:14    


 


Prealbumin  13.2 mg/dL (20-40)  L  11/17/22  04:14    











Weight: 134 lb 9.6 oz


Wound Present: No


Closed Surgical Incision Present: No


Negative Pressure Wound Therapy Present: No


Physician Update: Reports severe pain in the pelvic region and at max asssitance

with supine to sit and sit to stand. Walked 15' feet with max assistance. She is

very depressed and her lexapro will be increased to 20 mg daily. Goals of 

improving oral care with 5 minute sitting capacity. She has mild hyponatremia 

and low potassium will replace.


Summary: Patient's care plan and long term goals have been reviewed and revised 

as necessary. Please see the Rehabilitation Signature page for all necessary 

signatures.

## 2022-11-18 NOTE — PN
Date of Progress Note:  11/18/2022



Subjective:  The patient was seen this morning for followup.  She was lying in bed, not in distress. 
 She did sleep well last night.  She is complaining of some pain in her back, hip, and pelvis area.



Objective:  Vital Signs:  Reviewed. 

HEENT:  Unremarkable. 

Lungs:  Clear to auscultation. 

Heart:  Sounds normal. 

Abdomen:  Soft.  Bowel sounds normal.  No guarding, rigidity, tenderness, distention. 

Extremities:  No leg edema.



Laboratory Data:  Sodium 135, potassium 3.2, chloride 98, bicarb 30, BUN 16, creatinine 0.61, glucose
 114.



Impression:  

1.Fracture, right superior and inferior pubic rami.

2.Fracture, sacral ala.

3.Hypertension.

4.Anxiety.

5.Depression.



Plan:  We will go ahead and continue current medication.  Continue current pain medication, muscle re
laxant.  We will continue current DVT prophylaxis with Lovenox.  Stool softener will be continued for
 constipation problem.  Her anxiety and depression medications will be also continued as per current 
order.  I will see her tomorrow for followup.  Potassium is low and she is getting 10 mEq potassium 

chloride daily which was ordered yesterday, and we will give another 40 mEq potassium chloride 1 time
 dose today.





GABY/MODL

DD:  11/18/2022 11:31:41Voice ID:  [id number]

DT:  11/18/2022 17:46:21Report ID:  [id number]

## 2022-11-19 RX ADMIN — AMIODARONE HYDROCHLORIDE SCH MG: 200 TABLET ORAL at 20:00

## 2022-11-19 RX ADMIN — AMLODIPINE BESYLATE SCH MG: 5 TABLET ORAL at 20:01

## 2022-11-19 RX ADMIN — Medication SCH PATCH: at 07:27

## 2022-11-19 RX ADMIN — AMLODIPINE BESYLATE SCH MG: 5 TABLET ORAL at 08:01

## 2022-11-19 RX ADMIN — ENOXAPARIN SODIUM SCH MG: 40 INJECTION SUBCUTANEOUS at 07:27

## 2022-11-19 RX ADMIN — ASPIRIN SCH MG: 81 TABLET, COATED ORAL at 07:58

## 2022-11-19 RX ADMIN — TRAMADOL HYDROCHLORIDE PRN MG: 50 TABLET, COATED ORAL at 09:53

## 2022-11-19 RX ADMIN — ESCITALOPRAM OXALATE SCH MG: 20 TABLET, FILM COATED ORAL at 07:59

## 2022-11-19 RX ADMIN — SENNOSIDES AND DOCUSATE SODIUM SCH TAB: 8.6; 5 TABLET ORAL at 07:59

## 2022-11-19 RX ADMIN — TRAMADOL HYDROCHLORIDE PRN MG: 50 TABLET, COATED ORAL at 15:04

## 2022-11-19 RX ADMIN — FAMOTIDINE SCH MG: 20 TABLET, FILM COATED ORAL at 20:02

## 2022-11-19 RX ADMIN — SODIUM CHLORIDE TAB 1 GM SCH GM: 1 TAB at 20:04

## 2022-11-19 RX ADMIN — FAMOTIDINE SCH MG: 20 TABLET, FILM COATED ORAL at 07:58

## 2022-11-19 RX ADMIN — Medication SCH ML: at 08:01

## 2022-11-19 RX ADMIN — HYDROCODONE BITARTRATE AND ACETAMINOPHEN SCH TAB: 7.5; 325 TABLET ORAL at 20:02

## 2022-11-19 RX ADMIN — SENNOSIDES AND DOCUSATE SODIUM SCH TAB: 8.6; 5 TABLET ORAL at 20:01

## 2022-11-19 RX ADMIN — SODIUM CHLORIDE TAB 1 GM SCH GM: 1 TAB at 07:59

## 2022-11-19 RX ADMIN — METOPROLOL TARTRATE SCH MG: 25 TABLET ORAL at 08:00

## 2022-11-19 RX ADMIN — METOPROLOL TARTRATE SCH MG: 50 TABLET, FILM COATED ORAL at 20:02

## 2022-11-19 RX ADMIN — HYDROCODONE BITARTRATE AND ACETAMINOPHEN SCH TAB: 7.5; 325 TABLET ORAL at 12:52

## 2022-11-19 RX ADMIN — AMIODARONE HYDROCHLORIDE SCH MG: 200 TABLET ORAL at 07:59

## 2022-11-19 RX ADMIN — POTASSIUM CHLORIDE SCH MEQ: 10 TABLET, FILM COATED, EXTENDED RELEASE ORAL at 08:00

## 2022-11-19 RX ADMIN — HYDROCODONE BITARTRATE AND ACETAMINOPHEN SCH TAB: 7.5; 325 TABLET ORAL at 16:34

## 2022-11-19 RX ADMIN — HYDROCODONE BITARTRATE AND ACETAMINOPHEN PRN TAB: 5; 325 TABLET ORAL at 07:43

## 2022-11-19 RX ADMIN — PANTOPRAZOLE SODIUM SCH MG: 40 TABLET, DELAYED RELEASE ORAL at 07:27

## 2022-11-19 RX ADMIN — Medication SCH ML: at 20:00

## 2022-11-19 RX ADMIN — ATORVASTATIN CALCIUM SCH MG: 20 TABLET, FILM COATED ORAL at 20:02

## 2022-11-19 RX ADMIN — HYDROCODONE BITARTRATE AND ACETAMINOPHEN PRN TAB: 5; 325 TABLET ORAL at 03:12

## 2022-11-19 NOTE — PN
Date of Progress Note:  11/19/2022



Subjective:  The patient was seen this morning for followup.  She was lying in 
bed, not in distress.  Details were discussed with the Rehab nurse, who informed
me that the patient was having lot of pain and her morning dose of hydrocodone 
was given a little earlier than scheduled time because of the pain.  I did 
review her pain medication use and in the last 24 hours, she used 4 doses of 
hydrocodone, which is 5 mg and also used 3 doses of tramadol.  In the previous 
24 hours, she had used only 1 hydrocodone dose.  Her pain was not well 
controlled at home with Hydrocodone 5 mg dose. Her blood pressure is high as 
well and at present she is on metoprolol 25 mg twice a day and I have changed 
that as well today because of the blood pressure is elevated.



Physical Examination:

HEENT:  Unremarkable. 

Lungs:  Clear to auscultation. 

Heart:  Sounds normal. 

Abdomen:  Soft.  Bowel sounds normal.  No guarding, rigidity, tenderness, 
distention. 

Extremities:  No leg edema.



Impression:  

1.   Fracture, right superior and inferior pubic rami.

2.   Fracture, sacral ala.

3.   Hypertension.

4.   Anxiety.

5.   Depression.



Plan:  We will use hydrocodone 7.5 mg 4 times a day, her Haldol she should be on
2 mg 2 times a day on a scheduled basis which I have ordered and we will 
increase metoprolol to 50 mg 2 times a day.  Also 

change methocarbamol 500 mg 3 times a day on a scheduled basis.  I will see her 
tomorrow for followup.





GABY/MODL

DD:  11/19/2022 11:04:59   Voice ID:  [id number]

DT:  11/19/2022 13:30:09   Report ID:  [id number]

SAFIA

## 2022-11-20 RX ADMIN — METOPROLOL TARTRATE SCH MG: 50 TABLET, FILM COATED ORAL at 19:48

## 2022-11-20 RX ADMIN — METOPROLOL TARTRATE SCH MG: 50 TABLET, FILM COATED ORAL at 07:53

## 2022-11-20 RX ADMIN — Medication SCH ML: at 19:58

## 2022-11-20 RX ADMIN — Medication SCH PATCH: at 08:44

## 2022-11-20 RX ADMIN — HYDROCODONE BITARTRATE AND ACETAMINOPHEN SCH TAB: 7.5; 325 TABLET ORAL at 08:45

## 2022-11-20 RX ADMIN — ESCITALOPRAM OXALATE SCH MG: 20 TABLET, FILM COATED ORAL at 07:52

## 2022-11-20 RX ADMIN — POTASSIUM CHLORIDE SCH MEQ: 10 TABLET, FILM COATED, EXTENDED RELEASE ORAL at 08:45

## 2022-11-20 RX ADMIN — SODIUM CHLORIDE TAB 1 GM SCH GM: 1 TAB at 19:48

## 2022-11-20 RX ADMIN — AMLODIPINE BESYLATE SCH MG: 5 TABLET ORAL at 19:48

## 2022-11-20 RX ADMIN — ASPIRIN SCH MG: 81 TABLET, COATED ORAL at 07:52

## 2022-11-20 RX ADMIN — Medication SCH ML: at 08:46

## 2022-11-20 RX ADMIN — PANTOPRAZOLE SODIUM SCH MG: 40 TABLET, DELAYED RELEASE ORAL at 06:56

## 2022-11-20 RX ADMIN — ENOXAPARIN SODIUM SCH MG: 40 INJECTION SUBCUTANEOUS at 07:22

## 2022-11-20 RX ADMIN — ATORVASTATIN CALCIUM SCH MG: 20 TABLET, FILM COATED ORAL at 20:04

## 2022-11-20 RX ADMIN — FAMOTIDINE SCH MG: 20 TABLET, FILM COATED ORAL at 19:51

## 2022-11-20 RX ADMIN — AMIODARONE HYDROCHLORIDE SCH MG: 200 TABLET ORAL at 19:47

## 2022-11-20 RX ADMIN — SENNOSIDES AND DOCUSATE SODIUM SCH TAB: 8.6; 5 TABLET ORAL at 19:52

## 2022-11-20 RX ADMIN — TRAMADOL HYDROCHLORIDE PRN MG: 50 TABLET, COATED ORAL at 04:46

## 2022-11-20 RX ADMIN — SENNOSIDES AND DOCUSATE SODIUM SCH TAB: 8.6; 5 TABLET ORAL at 07:51

## 2022-11-20 RX ADMIN — HYDROCODONE BITARTRATE AND ACETAMINOPHEN SCH TAB: 7.5; 325 TABLET ORAL at 20:05

## 2022-11-20 RX ADMIN — HYDROCODONE BITARTRATE AND ACETAMINOPHEN SCH TAB: 7.5; 325 TABLET ORAL at 16:30

## 2022-11-20 RX ADMIN — AMIODARONE HYDROCHLORIDE SCH MG: 200 TABLET ORAL at 07:53

## 2022-11-20 RX ADMIN — HYDROCODONE BITARTRATE AND ACETAMINOPHEN SCH: 7.5; 325 TABLET ORAL at 12:42

## 2022-11-20 RX ADMIN — SODIUM CHLORIDE TAB 1 GM SCH GM: 1 TAB at 08:45

## 2022-11-20 RX ADMIN — AMLODIPINE BESYLATE SCH MG: 5 TABLET ORAL at 07:21

## 2022-11-20 RX ADMIN — FAMOTIDINE SCH MG: 20 TABLET, FILM COATED ORAL at 07:52

## 2022-11-20 NOTE — PN
Date of Progress Note:  11/20/2022



Subjective:  The patient was seen this morning for followup.  No new complaints or problems reported 
by the patient, lying in bed, not in distress.  She slept well last night.  She looks lot more comfor
table today and better today, well rested compared to yesterday.



Objective:  Vital Signs:  Reviewed. 

HEENT:  Examination unremarkable. 

Lungs:  Clear to auscultation. 

Heart:  Sounds normal. 

Abdomen:  Soft, bowel sounds normal.  No guarding, rigidity, tenderness, distention. 

Extremities:  No leg edema.



Impression:  

1.Fracture, L5 transverse process.

2.Fracture, sacral ala.

3.Fracture, right superior and inferior pubic rami.

4.Hypertension.

5.Anxiety.

6.Depression.

7.We will go ahead and continue current medication.  Continue current sodium chloride for hyponatrem
ia problem.  We will continue current antihypertensive medication.  Her pain seems to be under good c
ontrol.  We will continue current pain med management and DVT prophylaxis.  I will see her tomorrow f
or followup.





GABY/MODL

DD:  11/20/2022 10:34:06Voice ID:  [id number]

DT:  11/20/2022 14:50:46Report ID:  [id number]

## 2022-11-21 LAB
BUN BLD-MCNC: 24 MG/DL (ref 7–18)
GLUCOSE SERPLBLD-MCNC: 127 MG/DL (ref 74–106)
POTASSIUM SERPL-SCNC: 4.2 MMOL/L (ref 3.5–5.1)

## 2022-11-21 RX ADMIN — AMIODARONE HYDROCHLORIDE SCH MG: 200 TABLET ORAL at 08:50

## 2022-11-21 RX ADMIN — FAMOTIDINE SCH MG: 20 TABLET, FILM COATED ORAL at 18:48

## 2022-11-21 RX ADMIN — AMLODIPINE BESYLATE SCH MG: 5 TABLET ORAL at 07:42

## 2022-11-21 RX ADMIN — POTASSIUM CHLORIDE SCH MEQ: 10 TABLET, FILM COATED, EXTENDED RELEASE ORAL at 09:10

## 2022-11-21 RX ADMIN — HYDROCODONE BITARTRATE AND ACETAMINOPHEN SCH TAB: 7.5; 325 TABLET ORAL at 08:50

## 2022-11-21 RX ADMIN — Medication SCH ML: at 18:49

## 2022-11-21 RX ADMIN — Medication SCH PATCH: at 07:40

## 2022-11-21 RX ADMIN — HYDROCODONE BITARTRATE AND ACETAMINOPHEN SCH TAB: 7.5; 325 TABLET ORAL at 18:47

## 2022-11-21 RX ADMIN — AMIODARONE HYDROCHLORIDE SCH MG: 200 TABLET ORAL at 18:49

## 2022-11-21 RX ADMIN — SENNOSIDES AND DOCUSATE SODIUM SCH TAB: 8.6; 5 TABLET ORAL at 18:47

## 2022-11-21 RX ADMIN — SENNOSIDES AND DOCUSATE SODIUM SCH TAB: 8.6; 5 TABLET ORAL at 07:42

## 2022-11-21 RX ADMIN — HYDROCODONE BITARTRATE AND ACETAMINOPHEN SCH: 7.5; 325 TABLET ORAL at 13:00

## 2022-11-21 RX ADMIN — ATORVASTATIN CALCIUM SCH MG: 20 TABLET, FILM COATED ORAL at 18:47

## 2022-11-21 RX ADMIN — AMLODIPINE BESYLATE SCH MG: 5 TABLET ORAL at 18:48

## 2022-11-21 RX ADMIN — SODIUM CHLORIDE TAB 1 GM SCH GM: 1 TAB at 09:10

## 2022-11-21 RX ADMIN — ESCITALOPRAM OXALATE SCH MG: 20 TABLET, FILM COATED ORAL at 08:51

## 2022-11-21 RX ADMIN — METOPROLOL TARTRATE SCH MG: 50 TABLET, FILM COATED ORAL at 18:48

## 2022-11-21 RX ADMIN — ENOXAPARIN SODIUM SCH MG: 40 INJECTION SUBCUTANEOUS at 07:40

## 2022-11-21 RX ADMIN — HYDROCODONE BITARTRATE AND ACETAMINOPHEN SCH TAB: 7.5; 325 TABLET ORAL at 14:30

## 2022-11-21 RX ADMIN — PANTOPRAZOLE SODIUM SCH MG: 40 TABLET, DELAYED RELEASE ORAL at 06:51

## 2022-11-21 RX ADMIN — SODIUM CHLORIDE TAB 1 GM SCH GM: 1 TAB at 18:47

## 2022-11-21 RX ADMIN — ASPIRIN SCH MG: 81 TABLET, COATED ORAL at 07:42

## 2022-11-21 RX ADMIN — Medication SCH ML: at 09:10

## 2022-11-21 RX ADMIN — METOPROLOL TARTRATE SCH MG: 50 TABLET, FILM COATED ORAL at 09:11

## 2022-11-21 RX ADMIN — FAMOTIDINE SCH MG: 20 TABLET, FILM COATED ORAL at 07:42

## 2022-11-21 NOTE — PN
Date of Progress Note:  11/21/2022



Subjective:  Patient was seen this morning for followup.  No new complaints or problems reported by yumiko dawson.  She was sitting in chair.  Patient has had some hallucinations last night as reported by [facility] staff.  She slept off and on during nighttime.



Objective:  Vital Signs:  Reviewed. 

HEENT:  Unremarkable. 

Lungs:  Clear to auscultation. 

Heart:  Sounds normal. 

Abdomen:  Soft.  Bowel sounds normal.  No guarding, rigidity, tenderness, distention. 

Extremities:  No leg edema.



Laboratory Data:  Sodium 136, potassium 4.2, chloride 99, bicarb 30, BUN 24, creatinine 0.69, glucose
 127.



Impression:  

1.Fracture, right superior and inferior pubic rami.

2.Fracture, sacral ala.

3.Fracture, transverse process of L5.

4.Hypertension.

5.Anxiety.

6.Depression.



Plan:  We will go ahead and continue current medication, continue current antidepressant medicine per
 Dr. Hernandez.  Continue DVT prophylaxis.  We will continue current pain medication and I did receive 
a phone call from rehab director today that patient is really not participating well with physical 
ryan and she will not be able to stay on the rehab floor, so we will have to look into different [facility] for her care and I did recommend skilled nursing facility 

placement and he will communicate with the patient's son.  I will see her tomorrow for followup.





GABY/MODL

DD:  11/21/2022 18:55:56Voice ID:  [id number]

DT:  11/21/2022 23:05:49Report ID:  [id number]

## 2022-11-21 NOTE — PN
Date of Progress Note:  11/21/2022



Time:  8:30 a.m.



Subjective:  Ms. Malcolm is doing better today.  Reports her pain is around 5/10, down from 1000/10.  
She is resting in bed, smiling, and is about to begin therapy.  She denies any significant cramping a
re other complaints.



Review of Systems:

As noted, some pain in the pelvic region, but otherwise no fevers, chills, nausea, vomiting, or arthr
algias.  No rash, headache.  No psychiatric issues.  No dermatological issues.  No genitourinary or g
astrointestinal issues.



Physical Examination:

Vital Signs:  Blood pressure 145/65, pulse 67, respiratory rate 18, temperature 97.5, oxygen saturati
on 97%.  Weight 139 pounds, height 5 feet 3 inches. 

General:  Ms. Malcolm is resting in bed. 

HEENT:  She is normocephalic, atraumatic.  Sclerae nonicteric.  Oropharynx is moist. 

Neck:  Supple. 

Chest:  Clear. 

Heart:  Regular. 

Extremities:  Show no edema, cyanosis. 

Neurological:  She does not have focal deficits.  There is pain in the lower extremities, resulting i
n give-way in the lower extremities, but she is much better compared to last week.



Laboratory Studies:  No new laboratory studies.  Complete blood count with differential is essentiall
y unremarkable except for slightly elevated neutrophils.  WBCs were normal.  Chemistries:  Sodium 136
, potassium 4.2, BUN 24, creatinine 0.69, glucose of __________, calcium 9.2. 



X-ray/Imaging:  No x-ray imaging.



Medications:  Tylenol 650 every 6 hours, Norco 7.5/325 every 4 hours as needed, Cordarone 200 mg twic
e daily, Norvasc 5 mg twice daily, aspirin 81 mg daily, Lipitor 20 mg at bedtime, Dulcolax 10 mg per 
rectum for constipation as needed, clonidine 0.1 mg for systolic blood pressure greater than 160, Lov
enox 40 mg subcutaneously daily, Lexapro 10 mg XR 10 mg daily, Pepcid 20 mg twice daily, Haldol 2 mg 
every 12 hours as needed, lidocaine patch topically daily, milk of magnesia 30 mL for constipation da
endy as needed, Robaxin 500 mg 3 times daily, Lopressor 50 mg twice daily, Ensure Enlive 237 mL twice 
daily, Zyprexa 5 mg at bedtime, Protonix 40 mg daily, potassium replacement 20 mEq daily, Senokot-S t
wice daily, sodium chloride 1 g daily, Ultram 50 mg q.6 hours as needed, Desyrel 25 mg at bedtime.



Current Functional Status:  Currently, she requires moderate assistance with a rolling walker to ambu
late 10 feet on a level surface.  She did have decreased pacing and stride lengths, decreased heel st
rike, needed verbal cues to properly ambulate with a rolling walker.  Supine to sit transfers done wi
th moderate assistance.  Sit-to-stand transfers with moderate assistance with a rolling walker.  Mini
mum assistance, verbal cues required for proper sequencing.  Regarding her speech therapy progress an
d current level of functioning, she did require 20 minutes to read 2 paragraphs, 12 instances of mode
rate to maximum cues for alertness and focus to remain on task.



Progress Towards Rehabilitation Goals:  She made slow progress so far due to significant pain and per
haps some issues of mood, anxiety, and depression and worrying about her level of pain.  However, marlon
n medications have been adjusted and she appears to be ready to do better today as she has reported a
 pain level of 5/10 instead of a 1000/10.



Assessment And Plan:  Ms. Malcolm is admitted with pelvic fracture to the inpatient rehabilitation [facility] for aggressive physical, occupational, and speech therapy given her mood related issues and depress
ion and anxiety.  She is receiving therapy 3 hours a day 5/7 days including half an hour for 5/7 days
 of speech therapy. 



Her comorbid of anemia is addressed by iron and protein supplementation along with malnutrition.  Aga
in, aggressive protein supplementation.  Comorbid of hypertension at rest by adjusting her antihypert
ensive medications.  Risk of deep vein thrombosis addressed with Lovenox.  Insomnia addressed with me
latonin. Constipation with stool softeners and laxatives and she did have excessive sedation.  Her pa
in medications are being adjusted.  Comorbid of confusion, back repetition process.  She had signific
ant anxiety, depression, and worry about chronic pain, has actually been on narcotic medication for m
any years and is likely decompensated on narcotic medications.  She is on neuromodulators now, which 
can address the need for fewer narcotics as well as muscle relaxants and antidepressant medications. 
 The patient is also managed by her long time primary care physician, Dr. Archuleta, who is assisting in t
hese matters.





LB/MODL

DD:  11/21/2022 09:15:45Voice ID:  [id number]

DT:  11/21/2022 14:00:39Report ID:  [id number]

## 2022-11-22 VITALS — OXYGEN SATURATION: 94 %

## 2022-11-22 RX ADMIN — FAMOTIDINE SCH MG: 20 TABLET, FILM COATED ORAL at 06:47

## 2022-11-22 RX ADMIN — Medication SCH ML: at 20:54

## 2022-11-22 RX ADMIN — Medication SCH MG: at 20:54

## 2022-11-22 RX ADMIN — ENOXAPARIN SODIUM SCH MG: 40 INJECTION SUBCUTANEOUS at 07:30

## 2022-11-22 RX ADMIN — HYDROCODONE BITARTRATE AND ACETAMINOPHEN SCH TAB: 7.5; 325 TABLET ORAL at 17:31

## 2022-11-22 RX ADMIN — PANTOPRAZOLE SODIUM SCH MG: 40 TABLET, DELAYED RELEASE ORAL at 06:47

## 2022-11-22 RX ADMIN — SENNOSIDES AND DOCUSATE SODIUM SCH TAB: 8.6; 5 TABLET ORAL at 20:56

## 2022-11-22 RX ADMIN — SODIUM CHLORIDE TAB 1 GM SCH GM: 1 TAB at 08:45

## 2022-11-22 RX ADMIN — SODIUM CHLORIDE TAB 1 GM SCH GM: 1 TAB at 20:55

## 2022-11-22 RX ADMIN — ATORVASTATIN CALCIUM SCH MG: 20 TABLET, FILM COATED ORAL at 20:57

## 2022-11-22 RX ADMIN — HYDROCODONE BITARTRATE AND ACETAMINOPHEN SCH TAB: 7.5; 325 TABLET ORAL at 08:50

## 2022-11-22 RX ADMIN — POTASSIUM CHLORIDE SCH MEQ: 10 TABLET, FILM COATED, EXTENDED RELEASE ORAL at 08:00

## 2022-11-22 RX ADMIN — METOPROLOL TARTRATE SCH MG: 50 TABLET, FILM COATED ORAL at 08:43

## 2022-11-22 RX ADMIN — METOPROLOL TARTRATE SCH MG: 50 TABLET, FILM COATED ORAL at 20:55

## 2022-11-22 RX ADMIN — Medication SCH MG: at 08:49

## 2022-11-22 RX ADMIN — AMLODIPINE BESYLATE SCH MG: 5 TABLET ORAL at 06:48

## 2022-11-22 RX ADMIN — Medication SCH PATCH: at 08:00

## 2022-11-22 RX ADMIN — AMIODARONE HYDROCHLORIDE SCH MG: 200 TABLET ORAL at 08:44

## 2022-11-22 RX ADMIN — FAMOTIDINE SCH MG: 20 TABLET, FILM COATED ORAL at 20:56

## 2022-11-22 RX ADMIN — HYDROCODONE BITARTRATE AND ACETAMINOPHEN SCH TAB: 7.5; 325 TABLET ORAL at 20:57

## 2022-11-22 RX ADMIN — ESCITALOPRAM OXALATE SCH MG: 20 TABLET, FILM COATED ORAL at 08:44

## 2022-11-22 RX ADMIN — Medication SCH ML: at 08:46

## 2022-11-22 RX ADMIN — SENNOSIDES AND DOCUSATE SODIUM SCH TAB: 8.6; 5 TABLET ORAL at 08:43

## 2022-11-22 RX ADMIN — AMIODARONE HYDROCHLORIDE SCH MG: 200 TABLET ORAL at 20:53

## 2022-11-22 RX ADMIN — AMLODIPINE BESYLATE SCH MG: 5 TABLET ORAL at 20:56

## 2022-11-22 RX ADMIN — HYDROCODONE BITARTRATE AND ACETAMINOPHEN SCH TAB: 7.5; 325 TABLET ORAL at 12:35

## 2022-11-22 RX ADMIN — ASPIRIN SCH MG: 81 TABLET, COATED ORAL at 08:43

## 2022-11-22 NOTE — PN
Date of Progress Note:  11/22/2022



A face-to-face visit with the patient, Nellie Malcolm.



Subjective:  Ms. Malcolm when lying in bed and not doing anything, is smiling and very happy although 
she reports a pain level of 50/10.  Once the therapist gets her to sit on the side of the bed and to 
stand, she screams about significant pain and did rate at one point, pain has 1000/10.  However, she 
is distractible and would talk about different subjects despite reporting the pain and her affect, th
e appearance of her face, does not match the level of pain as she mentions.  Furthermore, she has rec
eived multiple pain medications including narcotic medications, Norco 7.5/325.  She has received musc
le relaxants.  She has also received tramadol, multiple antipsychotic medications including Haldol, Z
yprexa, Lexapro, and Cogentin by her primary care physician.



Review of Systems:

She has no fevers or chills.  She reports significant pelvic pain as noted, at least around 8/10 at t
he last check by the therapist.  No rash.  No weight change.  No headache.  She does have depression,
 anxiety, and reported psychosis and she is on antipsychotic medications.  Otherwise, no issues with 
gastrointestinal system, genitourinary system, pulmonary system, cardiovascular system, or renal syst
em.



Physical Examination:

Vital Signs:  Blood pressure 167/74, pulse 68, respiratory rate 16, temperature 98.0, and oxygen satu
ration 95% on room air.  

General:  Ms. Malcolm is lying comfortably in bed.  She is in no acute distress. 

HEENT:  She appears normocephalic, atraumatic.  Sclerae anicteric.  Oropharynx is pink and moist. 

Neck:  Supple. 

Chest:  Clear. 

Heart:  Regular. 

Extremities:  No edema or cyanosis.  When standing and ambulating, she describes that the pain is the
re, but again once she is distracted, she denies having ongoing significant pain at that point.  Her 
face betrays no evidence of severe pain.



Laboratory Studies:  No new laboratory studies except yesterday basic metabolic panel did not show si
gnificant abnormalities except her glucose was 127, calcium normal at 9.2, and creatinine 0.69.  



X-ray/Imaging:  No new x-rays.



Medications:  As noted previously she is on Tylenol 650 every 6 hours as needed, Norco 7.5/325 every 
4 hours scheduled, amiodarone 200 mg twice daily, Norvasc 5 mg twice daily, aspirin 81 mg daily, Lipi
tor 20 mg at bedtime, Cogentin 0.5 mg at bedtime, Dulcolax 10 mg per rectum for constipation, Catapre
s 0.1 mg 3 times daily, Lovenox 40 mg subcutaneously daily, Lexapro 10 mg daily, Pepcid 20 mg twice d
aily, Haldol 2 mg twice daily.  Lidocaine patch to apply to lower back daily, magnesium hydroxide 30 
mL daily for constipation, Robaxin 500 mg 3 times daily, metoprolol 50 mg twice daily, Ensure Enlive 
237 mL twice daily, Zyprexa 5 mg at bedtime, Zofran 4 mg every 6 hours, Protonix 40 mg daily, potassi
um 10 mEq daily, Senokot 2 tablets at bedtime, sodium chloride 1 g twice daily, Ultram 50 mg every 6 
hours, and trazodone 25 mg at bedtime as needed.



Current Functional Status:  Currently she performs supine-to-sit transfer with minimum assistance and
 sit-to-stand transfer with contact guard assistance using a rolling walker.  She needed maximum enco
uragement to complete those tasks.  Ambulation, with a rolling walker on a level surface with contact
 guard assistance, she covered 150 feet.  She did require maximum verbal cues to stay on task as she 
was reporting significant pain in the pelvic region. 



Progress towards rehabilitation goals:  She has made very slow progress reporting significant pain, w
hich appears to be out of proportion to her affect.  Pain at this point, is not likely to be as high 
as she is 4 weeks out from pelvic fracture.  However, she is scheduled with multiple pain medications
 and multiple antipsychotic medications, medications for sleep and muscle relaxants as well and these
 are scheduled by her primary physician.



Assessment And Plan:  Ms. Malcolm is an 82-year-old patient with multiple pelvic fractures who has had
 tremendous pain limiting her recovery.  However, those reports of pain do not always match her affec
t and she is on multiple pain medications as scheduled and her fractures are nonsurgical.  She does h
ave a comorbidity of anxiety and depression and she is on antidepressants.  She has had psychotic typ
e features.  She is on antipsychotics.  She has risk for deep vein thrombosis.  She is on Lovenox and
 with aspirin for risk for stroke reduction.  She does have slightly elevated blood sugars, which are
 being monitored.  She has a low prealbumin of 13.2, indicating moderate malnutrition.  She is on pro
tein supplementation.  She does not have anemia or elevated white blood cell count. 



Comorbidities that continue to impact rehabilitation process:  At this stage, the biggest comorbidity
 is severe pain, which again appears to be out of proportion, probably aggravated by anxiety, worry, 
depression, and stress.  It should be noted that she did see the psychiatrist yesterday and reportedl
y did not have a great experience and Dr. Archuleta, her primary care physician has her on multiple antips
ychotic and antidepressant medications and he is managing her from that aspect.  Her otherwise medica
l issues include hypertension, which requires improved control and medications may be addressed by Dr Maine Archuleta. 



Given the patient's poor progress and very difficult pain management situation and very poor particip
ation in aggressive therapy, it is determined that she is not able to continue in the inpatient rehab
ilitation unit at the level required.  She will be discharged to a skilled nursing, likely in [place] 
where the patient's son lives to continue therapy at a lower level of intensity, giving her more time
 to heal her pelvic fractures.  This is to be discussed with the  and Dr. Archuleta and delaney clark patient's son and the patient will be involved as well.





DERECK/OSKAR

DD:  11/22/2022 14:14:15Voice ID:  [id number]

DT:  11/22/2022 18:35:06Report ID:  [id number]

## 2022-11-22 NOTE — PN
Date of Progress Note:  11/22/2022



Subjective:  The patient was seen this morning for followup.  No new complaints or problems reported 
by the patient.  Lying in bed, not in distress.  When I walked into her room, she had removed all her
 clothes, so nurse immediately went and covered her up before I examine her.  Nurse was present with 
me throughout my visit with her today.



Objective:  Vital Signs:  Reviewed. 

HEENT:  Unremarkable. 

Lungs:  Clear to auscultation. 

Heart:  Sounds normal. 

Abdomen:  Soft.  Bowel sounds normal.  No guarding, rigidity, tenderness, distention. 

Extremities:  No leg edema.



Impression:  

1.Fracture, superior and inferior pubic rami.

2.Fracture, transverse process of L5.

3.Fracture, sacral ala.

4.Hypertension.

5.Anxiety.

6.Depression.

7.Possible senile dementia.



Plan:  We will go ahead and continue current pain medication, continue current antihypertensive medic
ations.  Her anxiety and depression problem is doing much better with medications prescribed by Dr. RENE cohn and he did evaluate her yesterday and he in fact called me today and discussed details with me
.  He does not recommend any adjustment on psychiatric medication at this point.  He is also concerne
d about the patient's underlying possibility of dementia problem, which I am definitely concerned abo
ut it.  The patient has not been formally evaluated by neurologist for this, but that definitely shou
ld be done on an elective outpatient basis.  Continue current DVT prophylaxis.  She is not able to pa
rticipate well with physical therapy as she keeps on having hallucinations as well as pain, which see
ms to be under better control, but obviously because of these 2 reasons and impaired cognition also, 
she is not able to participate as expected with physical therapy, so Rehab Floor is working with the 
patient's son for skilled nursing facility placement and the patient's son has expressed desire to br
ing her to skilled nursing facility close to where he lives in [facility] and Rehab Floor working wi
th family for the arrangements.  Urinalysis was done today using straight cath to rule out any underl
oksana urinary tract infection problem as it might contribute to some of her impaired cognition problem
 and her urinalysis came back normal.  I will see her tomorrow for followup.





GABY/MODL

DD:  11/22/2022 20:17:00Voice ID:  [id number]

DT:  11/22/2022 20:41:40Report ID:  [id number]

## 2022-11-23 VITALS — TEMPERATURE: 97.8 F

## 2022-11-23 VITALS — DIASTOLIC BLOOD PRESSURE: 84 MMHG | SYSTOLIC BLOOD PRESSURE: 182 MMHG

## 2022-11-23 RX ADMIN — ESCITALOPRAM OXALATE SCH MG: 20 TABLET, FILM COATED ORAL at 07:54

## 2022-11-23 RX ADMIN — Medication SCH MG: at 07:52

## 2022-11-23 RX ADMIN — SODIUM CHLORIDE TAB 1 GM SCH GM: 1 TAB at 07:54

## 2022-11-23 RX ADMIN — PANTOPRAZOLE SODIUM SCH MG: 40 TABLET, DELAYED RELEASE ORAL at 06:32

## 2022-11-23 RX ADMIN — POTASSIUM CHLORIDE SCH MEQ: 10 TABLET, FILM COATED, EXTENDED RELEASE ORAL at 07:53

## 2022-11-23 RX ADMIN — SENNOSIDES AND DOCUSATE SODIUM SCH TAB: 8.6; 5 TABLET ORAL at 07:52

## 2022-11-23 RX ADMIN — AMLODIPINE BESYLATE SCH MG: 5 TABLET ORAL at 08:38

## 2022-11-23 RX ADMIN — FAMOTIDINE SCH MG: 20 TABLET, FILM COATED ORAL at 07:54

## 2022-11-23 RX ADMIN — METOPROLOL TARTRATE SCH MG: 50 TABLET, FILM COATED ORAL at 08:37

## 2022-11-23 RX ADMIN — ENOXAPARIN SODIUM SCH MG: 40 INJECTION SUBCUTANEOUS at 07:08

## 2022-11-23 RX ADMIN — AMIODARONE HYDROCHLORIDE SCH MG: 200 TABLET ORAL at 07:52

## 2022-11-23 RX ADMIN — Medication SCH ML: at 07:55

## 2022-11-23 RX ADMIN — TRAMADOL HYDROCHLORIDE PRN MG: 50 TABLET, COATED ORAL at 06:42

## 2022-11-23 RX ADMIN — HYDROCODONE BITARTRATE AND ACETAMINOPHEN SCH TAB: 7.5; 325 TABLET ORAL at 08:38

## 2022-11-23 RX ADMIN — Medication SCH PATCH: at 08:39

## 2022-11-23 RX ADMIN — ASPIRIN SCH MG: 81 TABLET, COATED ORAL at 07:53

## 2022-11-23 NOTE — DS
Date of Discharge:  11/23/2022



Disposition:  Discharged with family to go to skilled nursing facility in [facility].



Physical Examination:

HEENT:  Unremarkable. 

Lungs:  Clear to auscultation. 

Heart:  Sounds normal. 

Abdomen:  Soft.  Bowel sounds normal.  No guarding, rigidity, tenderness, distention. 

Extremities:  No leg edema.



Laboratory Data:  On 11/17/2022; white count 7.8, hemoglobin 12.3, platelets 352.  Last chemistry on 
11/21/2022; sodium 136, potassium 4.2, chloride 99, bicarb 30, BUN 24, creatinine 0.69, glucose 127. 
 Lowest potassium was 3.1 on 11/17/2022, which was corrected.  Serum albumin level was 3.



Discharge Medications/instructions:  

1.Continue all current medications and current list of medication copy was given to patient's son by
 me today when I saw her.

2.Family to take the patient to skilled nursing facility in [facility] for further care.



Hospital Course:  This is an 82-year-old very pleasant female patient admitted to the hospital to cass
ab floor.  Please see dictated H and P for more information.  The patient was brought to inpatient re
hab from medical floor and Dr. Avalos was consulted from rehab floor to manage her rehab therapy.  
Dr. Hernandez was consulted from Psychiatry Service who did evaluate her and did not recommend any adju
stment on her current medications, but he did recommend adding Cogentin 0.5 mg daily at bedtime, whic
h was started yesterday.  The patient does have some impaired cognition and we are concerned about po
ssibility of some underlying senile dementia.  Her urinalysis done yesterday was negative for any cornelia
dence of urinary tract infection.  The patient did not progress as well as expected with physical the
rapy and she has been having some hallucinations and her complaint of pain is limiting her therapy, s
o with that in mind, it was recommended for patient to go to skilled nursing facility.  She is not ab
le to get the benefit of inpatient rehab therapy as expected.  The patient's son was contacted from [facility] rehab floor, who expressed desire for her to go to skilled nursing facility in [facility] and karthik schumacher has made this arrangements and today the patient's son will provide her transportation to go to [facility] nursing facility of family's choice.  Depending on how she does, further long-term decision wi
ll be made whether she will end up staying in the skilled nursing facility or if she is well enough t
o come back home to [facility] or not.  If she is able to come back home, then she will come t
o see me for followup upon arrival back to [facility].



Final Diagnoses:  

1.Fracture, superior and inferior pubic rami.

2.Fracture, sacral ala.

3.Fracture, L5 transverse process.

4.Hyponatremia.

5.Hypokalemia.

6.Hypertension.

7.Hyperlipidemia.

8.Anxiety.

9.Depression.

10.Allergic rhinitis.

11.Osteoporosis.

12.Probable coronary artery disease.

13.Possible senile dementia. 

The patient's son was advised that the patient should have elective outpatient evaluation by neurolog
ist to look into possibility of senile dementia when she is at her baseline.





GABY/MODL

DD:  11/23/2022 11:44:41Voice ID:  [id number]

DT:  11/23/2022 20:06:09Report ID:  [id number]

## 2022-11-29 NOTE — CON
Date of Consultation:  11/21/2022



Reason For Consultation:  Evaluate patient for agitation and provide recommendation.



History Of Present Illness:  Ms. Nellie Maloclm is an 82-year-old  female with a psychiatric h
istory significant for anxiety, depression, and cognitive disorder.  She also has comorbid medical et
iology, which is significant for hypertension, hyperlipidemia, __________.  She was admitted on 11/16
/2022 through the inpatient rehab floor on account of recent fall with x-ray showing comminuted right
 obturator ring fracture with displacement and bilateral sacral fractures.  She has been managed ____
______ surgery was not required.  While in the unit, patient was __________ to be confused and agitat
ed.  Patient's confusion and agitation tends to get worse in the evenings and it becomes very difficu
lt to redirect or manage conservatively, hence request for psychiatric evaluation and recommendation.
  Nurses __________ patient gets very combative, loud.  __________ she struggles with memory and gets
 worse when she cannot remember things that she wants to say.  On interview, patient confirms she is 
depressed and anxious.  She is confused where she is and states __________ when it happened and what 
led to this aspect.  She seems to be irritable as interview progresses, feeling suspicious and keeps 
asking why she has been asked all these questions and __________ she denies feeling suicidal and the 
need to having __________, which was also confirmed by her nurse.  Still the major issue is  generali
zed body pain.  When asked what led to the pain, she could not remember her fall.  __________ cogniti
ve function has been deteriorating since the previous year when she apparently stopped working with _
_________.  Since then, patient has been involved in multiple __________ and her son has stopped her 
from driving.  Today, her son states that __________ end of April this year and since then they have 
to have a home care 24/7 to assist her with her needs.  He states patient's condition has progressive
ly gotten worse and also her behavior.  She knows that __________ and gets easily irritable when they
 have a conversation.  __________ patient's mom also had dementia and some of the behaviors that she 
is exhibiting are similar to what her mom exhibited.  She states his plan is to move into near [facility] closer to where he lives in [place], so that he can also keep an eye on her.  He said, prior to his
 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  Patient denies any history of suicidal attempt.  Patient has never been admit
andre in psychiatric facility.  No __________.



Mental Status Examination:  Patient is a well-nourished  female lying in bed __________ not 
in any acute respiratory distress.  __________ no nasal cannula in place.  She is cooperative _______
___ participate in serial 3's and serial 7's __________ being evaluated by his provider __________ wh
y she has been asked all these questions repeatedly.  No suicidal ideation noted and patient ________
__ blood pressure 155/65, pulse 67, respiratory rate is 18 __________.



Impression:  This is an 82-year-old  female came to the inpatient rehab __________ has histo
ry of depression, anxiety, __________.



Diagnoses:  

1.Neurocognitive disorder with visual disturbance.

2.Delirium.

3.Major depressive disorder, mild to moderate.

4.Anxiety disorder.



Plan:  

1.Recommend Lexapro 10 mg p.o. daily for anxiety and depression.

2.Recommend Haldol 2 mg p.o. b.i.d. for agitation.

3.Recommend continue Zyprexa 5 mg p.o. at bedtime.

4.Recommend benztropine 0.5 mg p.o. at bedtime for __________ symptoms.  Discussed treatment recomme
ndation with treatment team.





ARCHANA

DD:  11/28/2022 05:52:53Voice ID:  [id number]

DT:  11/28/2022 12:11:36Report ID:  [id number]